# Patient Record
Sex: FEMALE | Race: BLACK OR AFRICAN AMERICAN | NOT HISPANIC OR LATINO | Employment: OTHER | URBAN - METROPOLITAN AREA
[De-identification: names, ages, dates, MRNs, and addresses within clinical notes are randomized per-mention and may not be internally consistent; named-entity substitution may affect disease eponyms.]

---

## 2023-10-03 ENCOUNTER — OFFICE VISIT (OUTPATIENT)
Dept: GERIATRIC MEDICINE | Facility: CLINIC | Age: 88
End: 2023-10-03
Payer: MEDICARE

## 2023-10-03 DIAGNOSIS — S06.5XAA SDH (SUBDURAL HEMATOMA) (MULTI): ICD-10-CM

## 2023-10-03 DIAGNOSIS — R16.1 SPLENOMEGALY: ICD-10-CM

## 2023-10-03 DIAGNOSIS — R63.4 WEIGHT LOSS: Primary | ICD-10-CM

## 2023-10-03 DIAGNOSIS — M85.80 OSTEOPENIA, UNSPECIFIED LOCATION: ICD-10-CM

## 2023-10-03 DIAGNOSIS — Z74.09 IMPAIRED FUNCTIONAL MOBILITY, BALANCE, GAIT, AND ENDURANCE: ICD-10-CM

## 2023-10-03 DIAGNOSIS — G47.00 INSOMNIA, UNSPECIFIED TYPE: ICD-10-CM

## 2023-10-03 DIAGNOSIS — D64.9 ANEMIA, UNSPECIFIED TYPE: ICD-10-CM

## 2023-10-03 DIAGNOSIS — R32 URINARY INCONTINENCE, UNSPECIFIED TYPE: ICD-10-CM

## 2023-10-03 DIAGNOSIS — E46 PROTEIN-CALORIE MALNUTRITION, UNSPECIFIED SEVERITY (MULTI): ICD-10-CM

## 2023-10-03 DIAGNOSIS — G31.83 NEUROCOGNITIVE DISORDER WITH LEWY BODIES (MULTI): ICD-10-CM

## 2023-10-03 DIAGNOSIS — F02.80 NEUROCOGNITIVE DISORDER WITH LEWY BODIES (MULTI): ICD-10-CM

## 2023-10-03 DIAGNOSIS — B35.1 ONYCHOMYCOSIS: ICD-10-CM

## 2023-10-03 DIAGNOSIS — R44.3 HALLUCINATIONS: ICD-10-CM

## 2023-10-03 DIAGNOSIS — D72.829 LEUKOCYTOSIS, UNSPECIFIED TYPE: ICD-10-CM

## 2023-10-03 DIAGNOSIS — R59.1 LYMPHADENOPATHY: ICD-10-CM

## 2023-10-03 PROCEDURE — 3078F DIAST BP <80 MM HG: CPT | Performed by: NURSE PRACTITIONER

## 2023-10-03 PROCEDURE — 1159F MED LIST DOCD IN RCRD: CPT | Performed by: NURSE PRACTITIONER

## 2023-10-03 PROCEDURE — 99345 HOME/RES VST NEW HIGH MDM 75: CPT | Performed by: NURSE PRACTITIONER

## 2023-10-03 PROCEDURE — 1126F AMNT PAIN NOTED NONE PRSNT: CPT | Performed by: NURSE PRACTITIONER

## 2023-10-03 PROCEDURE — 1160F RVW MEDS BY RX/DR IN RCRD: CPT | Performed by: NURSE PRACTITIONER

## 2023-10-03 PROCEDURE — 1036F TOBACCO NON-USER: CPT | Performed by: NURSE PRACTITIONER

## 2023-10-03 PROCEDURE — 3074F SYST BP LT 130 MM HG: CPT | Performed by: NURSE PRACTITIONER

## 2023-10-03 ASSESSMENT — PAIN SCALES - GENERAL: PAINLEVEL: 0-NO PAIN

## 2023-10-04 VITALS
HEART RATE: 72 BPM | BODY MASS INDEX: 17.46 KG/M2 | WEIGHT: 95.46 LBS | RESPIRATION RATE: 18 BRPM | SYSTOLIC BLOOD PRESSURE: 120 MMHG | DIASTOLIC BLOOD PRESSURE: 68 MMHG

## 2023-10-04 RX ORDER — ALBUTEROL SULFATE 0.83 MG/ML
3 SOLUTION RESPIRATORY (INHALATION) EVERY 6 HOURS PRN
COMMUNITY

## 2023-10-04 RX ORDER — PYRIDOXINE HCL (VITAMIN B6) 100 MG
100 TABLET ORAL DAILY
COMMUNITY
End: 2023-11-06 | Stop reason: SDDI

## 2023-10-04 ASSESSMENT — ENCOUNTER SYMPTOMS
DEPRESSION: 0
LOSS OF SENSATION IN FEET: 0
OCCASIONAL FEELINGS OF UNSTEADINESS: 1

## 2023-10-04 NOTE — PROGRESS NOTES
Summary Statement: Mrs. Mancera is a 89 yo elderly woman with a PMH significant form COPD/asthma, HFpEF (60%), HTN, Chronic compression fracture T6, polycythemia vera,  subdural hematoma (2019),  TIA, Dementia, hallucinations,  COVID 19 virus  with acute hypoxic respiratory  failure, (11/2022)  chronic leukocytosis, anemia, Lymphadenopathy concerning for lymphoma , Splenomegaly, PCV JAK2 V617F+,  UTI, weight loss,  polycythemia vera, and impaired gait    PSH:  hysterectomy ('60's) and cataract surgery    Reason for homebound status: leaving the home requires considerable taxing effort due impaired gait and the assistance of another person due to cognitive impairment     Reason for visit:  Weight Loss and establish new PCP relationship     HPI     -Mrs. Mancera is being seen today at home in the presence of her son, Reynold, who she has been living with since June 2023.  She is a poor historian due to dementia.      Patient was hospitalized in June 2023 for altered mental status was treated for an UTI.  Previously was living alone, but son moved her in with him    Patient receives [facility] services through [facility] to [facility] [phone]   She receives services on [facility] from 12:00 PM- 5:00 PM , but Reynold Rea is requesting a few additional hours on Saturdays.     States patient was on Olanzapine at the time of discharge, but he stopped it because it made her worse.  States occasional hallucinations, but none lately, he states though difficulty sleeping is a problem and he would like to improve that.     States patient used to received Nutritional Supplements and incontinence supplies, but states even before moving in with him, they had stopped.     [facility] stated Boost is on back order , but to send in script.  Reynold states over a period of time patient has lost a lot of weight.  She eats well and was eating when NP arrived.  Loves coffee, but he has limited it.      He would like for her to received PT- states her gait is unsteady- NP observed at the time of visit. Uses a Rollator. She denies any pain or discomfort     She resides on the upstairs in her bedroom   Home environment- clean and spacious  He did purchase a BSC for night-time use  Does not want a hospital bed    Occasional urinary continence- requesting Depends   Moving bowels well  Interval History:   Recent illness, hospitalizations or surgeries since last visit: No  Previous hospitalizations: N/A  Code Status-wishes to be resuscitated     Diet and Physical Activity:  Current Diet:  regular  Dietary Supplements: boost regular one tid   Appetite: good  Food Consistency: Regular  Liquids Consistency: thin   Gait/Mobility: Unsteady    Sensory impairments:  Visual: glasses No Last exam: unknown  Hearing: hearing No Last exam:   Dental: dentures No Last exam:     Activities of Daily Living  Bathing:  Needs Assistance  Dressing:  Needs Assistance  Toileting:  Needs Assistance  Feeding:   Dependent  Personal Hygiene:  Needs Assistance  Continence:       Bowels: Occasional Accident       Bladder:  Occasional Accident  Instrumental Activities of Daily Living  Managing Finances:  Dependent  Shopping:   Dependent  Managing Medications:   Dependent  Basic Home Maintenance:   Dependent  Housework:   Dependent  Laundry:       Dependent  Preparing Meals:   Dependent  Other AssistanceN/A      Advanced Directives on file NO        Falls Risk Screening:  Have you fallen in last 6 months? /N  If yes, did your fall result in injury? NA    Home Safety Risk Factors:  None ,  Loose Rugs,  Poor Lighting,  Uneven Floors, No Grab Bars, Bathroom, Unfamiliar surrounds, Household Clutter, No stair Handrails,   See Above   Advance Directives:  Discussed, verbal or written information provided if indicated  Living will: N  ,   Healthcare POA: N  Patient's End of Life Decisions:  Reviewed with pt: Wishes to be resuscitated   Full Code [ X],  DNR-CC Arrest [ ],  DNR-CC [ ]  Additional information:    Merged with [facility] Elder Abuse Screening:  Have you relied on people for any of the following: bathing, dressing, shopping, banking, or meals? Y/N  Has anyone prevented you from getting: food, clothes, medications, glasses, hearing aides, medical care, or from being with people you want to be with? Y/N  Have you been upset because someone talked to you in a way that made you feel shamed or threatened? Y/N  Has anyone tried to force you to sign papers or to use money against your will? Y/N  Has anyone made you afraid, touched you in ways that you did not want or hurt you physically? Y/N  Merged with [facility] Controlled Substance Monitoring:  Medication #1:  Medication name: ________  Pill Count: ____  Recent lab results: ___  Signs of misuse: Y/N    ____________________  Signs of Abuse: Y/N     ____________________  Signs of Diversion: Y/N ____________________  Medication #2  Medication name: ________  Pill Count: ____  Recent lab results: ___  Signs of misuse: Y/N    ____________________  Signs of Abuse: Y/N     ____________________  Signs of Diversion: Y/N ____________________  Medication #3  Medication name: ________  Pill Count: ____  Recent lab results: ___  Signs of misuse: Y/N    ____________________  Signs of Abuse: Y/N     ____________________  Signs of Diversion: Y/N ____________________        Review of Systems    -Unable to obtain due to cognitive impairment-see HPI       Physical Exam    GENERAL APPEARANCE: A&O x 2, thin, NAD  HEAD: normocephalic, atraumatic.  THROAT: Oral cavity and pharynx normal. No inflammation, swelling, exudate, or lesions.  MMM. Tongue pink and midline. No thrush. Missing teeth. No halitosis.   NECK: Neck supple, non-tender without lymphadenopathy, masses or thyromegaly.  CARDIAC: Normal S1 and S2. No S3, S4 or murmurs. Rhythm is regular. There is no peripheral edema, cyanosis or pallor. Extremities are warm and well perfused. No carotid  bruits.  LUNGS: Clear to auscultation bilaterally without rales, rhonchi, wheezing or diminished breath sounds. No cough.  ABDOMEN: Positive bowel sounds. Soft, nondistended, nontender. No guarding or rebound. + hernia vs lipoma right side of abdomen, nontender, BS normoactive x 4 , and wearing a diaper.     EXTREMITIES: Gait unsteady. Slight contractures to knees Peripheral pulses intact. No varicosities.  SKIN: Skin normal color, texture and turgor with no lesions or rash. Toenails are thick .  NEURO: No tremors, speech clear, follows commands, ST memory impaired, and LT memory intact.   PSYCHIATRIC: oriented to person & place.  Without hallucinations, abnormal affect or abnormal behaviors during the examination. Patient is not suicidal.        Assessment/Plan     1. Weight loss- Protein-calorie malnutrition, unspecified severity   -weighs 95.4 lbs  -Continue Boost one carton three times a day ( on back order per [facility])  -Continue Vitamin B6 100 mg daily  -Check B6 , B12, CMP, Prealbumin, and TSH       2. Insomnia, unspecified type-  -discussed starting Melatonin 1-3 mg at bedtime  -Turkey meat in the evening   -May try Sleepy Time Tea (hold if increasing urination)   -Continue to monitor       3. Neurocognitive disorder with Lewy bodies -SDH (subdural hematoma) - Hallucinations  -remote history of  SDH   -Olanzapine made behavior worse  -Try to improve sleep practices for now   -Previous mentation documented to be AOx2-3 (November 2022),  now at baseline.   -CT of the head w/extensive white matter changes, remote infarcts, volume loss  .    4. Splenomegaly -Chronic leukocytosis   Lymphadenopathy concerning for lymphoma   Documentation taken from 11/30/2022 discharge summary note-   PCV JAK2 V617F+  - Established with Dr. Irizarry ([facility]) - Dr. Irizarry recommended BM bx in 2019 which patient refused. Cell counts have been relatively stable since then. Pt was taken off of Hydroxyurea in 11/2021 due  to anemia. Severe leukocytosis persists with  significant splenomegaly and lymphadenopathy concerning for lymphoma. After discussion with the patient, she prefers to have this worked up outpatient. HEME labs largely pending; flow cytometry,  surface marker, path review. Discussed case by Dr Mosqueda with hematology/oncology, appreciate assistance. Confirmed onc team ok with dexamethasone prior to starting 11/23 - no current plans for bx.      -NP will discuss with son at next visit in one month  -check CBC   5. Urinary incontinence, unspecified type-  -will order depends ( size small), size large gloves,   Luda-wipes, and body wash       6. Anemia, unspecified type-  -check cbc, folate       7 Impaired functional mobility, balance, gait, and endurance-  -referral made to  Home Care for PT to evaluate and treat         8. Onychomycosis-  -referral made to Podiatry     9. Osteopenia -  -check Vitamin D level    -Stable  -Routine follow up in one month

## 2023-10-05 ENCOUNTER — HOME HEALTH ADMISSION (OUTPATIENT)
Dept: HOME HEALTH SERVICES | Facility: HOME HEALTH | Age: 88
End: 2023-10-05
Payer: MEDICARE

## 2023-10-05 PROBLEM — Z86.73 PERSONAL HISTORY OF TRANSIENT ISCHEMIC ATTACK (TIA), AND CEREBRAL INFARCTION WITHOUT RESIDUAL DEFICITS: Status: ACTIVE | Noted: 2019-10-10

## 2023-10-05 PROBLEM — J45.909 UNSPECIFIED ASTHMA, UNCOMPLICATED (HHS-HCC): Status: ACTIVE | Noted: 2019-10-10

## 2023-10-05 PROBLEM — R60.9 EDEMA: Status: ACTIVE | Noted: 2023-10-05

## 2023-10-05 PROBLEM — I10 ESSENTIAL HYPERTENSION: Status: ACTIVE | Noted: 2019-10-10

## 2023-10-05 PROBLEM — E46 PROTEIN-CALORIE MALNUTRITION, UNSPECIFIED SEVERITY (MULTI): Status: ACTIVE | Noted: 2023-10-05

## 2023-10-05 PROBLEM — R63.4 WEIGHT LOSS: Status: ACTIVE | Noted: 2023-10-05

## 2023-10-05 PROBLEM — S06.5XAA SDH (SUBDURAL HEMATOMA) (MULTI): Status: ACTIVE | Noted: 2023-10-05

## 2023-10-05 RX ORDER — CARBOXYMETHYLCELLULOSE SODIUM 10 MG/ML
SOLUTION/ DROPS OPHTHALMIC
COMMUNITY

## 2023-10-10 ENCOUNTER — HOME CARE VISIT (OUTPATIENT)
Dept: HOME HEALTH SERVICES | Facility: HOME HEALTH | Age: 88
End: 2023-10-10
Payer: MEDICARE

## 2023-10-10 PROCEDURE — 1090000001 HH PPS REVENUE CREDIT

## 2023-10-10 PROCEDURE — 0023 HH SOC

## 2023-10-10 PROCEDURE — G0151 HHCP-SERV OF PT,EA 15 MIN: HCPCS | Mod: HHH

## 2023-10-10 PROCEDURE — 1090000002 HH PPS REVENUE DEBIT

## 2023-10-10 PROCEDURE — 169592 NO-PAY CLAIM PROCEDURE

## 2023-10-11 ENCOUNTER — HOME CARE VISIT (OUTPATIENT)
Dept: HOME HEALTH SERVICES | Facility: HOME HEALTH | Age: 88
End: 2023-10-11
Payer: MEDICARE

## 2023-10-11 PROCEDURE — 1090000001 HH PPS REVENUE CREDIT

## 2023-10-11 PROCEDURE — 1090000002 HH PPS REVENUE DEBIT

## 2023-10-12 VITALS — SYSTOLIC BLOOD PRESSURE: 112 MMHG | DIASTOLIC BLOOD PRESSURE: 58 MMHG

## 2023-10-12 PROCEDURE — 1090000002 HH PPS REVENUE DEBIT

## 2023-10-12 PROCEDURE — 1090000001 HH PPS REVENUE CREDIT

## 2023-10-12 ASSESSMENT — ENCOUNTER SYMPTOMS
SUBJECTIVE PAIN PROGRESSION: UNCHANGED
HIGHEST PAIN SEVERITY IN PAST 24 HOURS: 0/10
PAIN: 1
PERSON REPORTING PAIN: PATIENT
PAIN SEVERITY GOAL: 0/10
DEPRESSION: 0
LOSS OF SENSATION IN FEET: 0
OCCASIONAL FEELINGS OF UNSTEADINESS: 1
LOWEST PAIN SEVERITY IN PAST 24 HOURS: 0/10
MUSCLE WEAKNESS: 1

## 2023-10-12 ASSESSMENT — PAIN SCALES - PAIN ASSESSMENT IN ADVANCED DEMENTIA (PAINAD)
FACIALEXPRESSION: 0
BREATHING: 0
NEGVOCALIZATION: 0 - NONE.
NEGVOCALIZATION: 0
TOTALSCORE: 0
COMMENTS: NO SOB
FACIALEXPRESSION: 0 - SMILING OR INEXPRESSIVE.
CONSOLABILITY: 0
BODYLANGUAGE: 0 - RELAXED.
CONSOLABILITY: 0 - NO NEED TO CONSOLE.
BODYLANGUAGE: 0

## 2023-10-12 ASSESSMENT — ACTIVITIES OF DAILY LIVING (ADL)
AMBULATION_DISTANCE/DURATION_TOLERATED: 25
ENTERING_EXITING_HOME: NEEDS ASSISTANCE
AMBULATION ASSISTANCE: STAND BY ASSIST
AMBULATION ASSISTANCE ON FLAT SURFACES: 1
OASIS_M1830: 02
AMBULATION ASSISTANCE: 1

## 2023-10-13 PROCEDURE — 1090000002 HH PPS REVENUE DEBIT

## 2023-10-13 PROCEDURE — 1090000001 HH PPS REVENUE CREDIT

## 2023-10-14 PROCEDURE — 1090000001 HH PPS REVENUE CREDIT

## 2023-10-14 PROCEDURE — 1090000002 HH PPS REVENUE DEBIT

## 2023-10-14 PROCEDURE — G0180 MD CERTIFICATION HHA PATIENT: HCPCS | Performed by: NURSE PRACTITIONER

## 2023-10-15 PROCEDURE — 1090000001 HH PPS REVENUE CREDIT

## 2023-10-15 PROCEDURE — 1090000002 HH PPS REVENUE DEBIT

## 2023-10-16 PROCEDURE — 1090000002 HH PPS REVENUE DEBIT

## 2023-10-16 PROCEDURE — 1090000001 HH PPS REVENUE CREDIT

## 2023-10-17 ENCOUNTER — APPOINTMENT (OUTPATIENT)
Dept: HOME HEALTH SERVICES | Facility: HOME HEALTH | Age: 88
End: 2023-10-17

## 2023-10-17 ENCOUNTER — HOME CARE VISIT (OUTPATIENT)
Dept: HOME HEALTH SERVICES | Facility: HOME HEALTH | Age: 88
End: 2023-10-17
Payer: MEDICARE

## 2023-10-17 VITALS — SYSTOLIC BLOOD PRESSURE: 100 MMHG | RESPIRATION RATE: 18 BRPM | HEART RATE: 66 BPM | DIASTOLIC BLOOD PRESSURE: 62 MMHG

## 2023-10-17 PROCEDURE — 1090000001 HH PPS REVENUE CREDIT

## 2023-10-17 PROCEDURE — 1090000002 HH PPS REVENUE DEBIT

## 2023-10-17 PROCEDURE — G0157 HHC PT ASSISTANT EA 15: HCPCS | Mod: HHH

## 2023-10-18 PROCEDURE — 1090000002 HH PPS REVENUE DEBIT

## 2023-10-18 PROCEDURE — 1090000001 HH PPS REVENUE CREDIT

## 2023-10-19 ENCOUNTER — HOME CARE VISIT (OUTPATIENT)
Dept: HOME HEALTH SERVICES | Facility: HOME HEALTH | Age: 88
End: 2023-10-19
Payer: MEDICARE

## 2023-10-19 VITALS — DIASTOLIC BLOOD PRESSURE: 62 MMHG | HEART RATE: 78 BPM | SYSTOLIC BLOOD PRESSURE: 90 MMHG | RESPIRATION RATE: 18 BRPM

## 2023-10-19 PROCEDURE — 1090000001 HH PPS REVENUE CREDIT

## 2023-10-19 PROCEDURE — 1090000002 HH PPS REVENUE DEBIT

## 2023-10-19 PROCEDURE — G0157 HHC PT ASSISTANT EA 15: HCPCS | Mod: HHH

## 2023-10-20 PROCEDURE — 1090000002 HH PPS REVENUE DEBIT

## 2023-10-20 PROCEDURE — 1090000001 HH PPS REVENUE CREDIT

## 2023-10-21 PROCEDURE — 1090000001 HH PPS REVENUE CREDIT

## 2023-10-21 PROCEDURE — 1090000002 HH PPS REVENUE DEBIT

## 2023-10-22 PROCEDURE — 1090000001 HH PPS REVENUE CREDIT

## 2023-10-22 PROCEDURE — 1090000002 HH PPS REVENUE DEBIT

## 2023-10-23 PROCEDURE — 1090000002 HH PPS REVENUE DEBIT

## 2023-10-23 PROCEDURE — 1090000001 HH PPS REVENUE CREDIT

## 2023-10-24 ENCOUNTER — HOME CARE VISIT (OUTPATIENT)
Dept: HOME HEALTH SERVICES | Facility: HOME HEALTH | Age: 88
End: 2023-10-24
Payer: MEDICARE

## 2023-10-24 VITALS — RESPIRATION RATE: 18 BRPM | HEART RATE: 68 BPM | DIASTOLIC BLOOD PRESSURE: 62 MMHG | SYSTOLIC BLOOD PRESSURE: 100 MMHG

## 2023-10-24 PROCEDURE — 1090000001 HH PPS REVENUE CREDIT

## 2023-10-24 PROCEDURE — G0157 HHC PT ASSISTANT EA 15: HCPCS | Mod: HHH

## 2023-10-24 PROCEDURE — 1090000002 HH PPS REVENUE DEBIT

## 2023-10-25 PROCEDURE — 1090000002 HH PPS REVENUE DEBIT

## 2023-10-25 PROCEDURE — 1090000001 HH PPS REVENUE CREDIT

## 2023-10-26 ENCOUNTER — HOME CARE VISIT (OUTPATIENT)
Dept: HOME HEALTH SERVICES | Facility: HOME HEALTH | Age: 88
End: 2023-10-26
Payer: MEDICARE

## 2023-10-26 VITALS — RESPIRATION RATE: 17 BRPM | HEART RATE: 64 BPM | SYSTOLIC BLOOD PRESSURE: 98 MMHG | DIASTOLIC BLOOD PRESSURE: 58 MMHG

## 2023-10-26 PROCEDURE — 1090000002 HH PPS REVENUE DEBIT

## 2023-10-26 PROCEDURE — G0157 HHC PT ASSISTANT EA 15: HCPCS | Mod: HHH

## 2023-10-26 PROCEDURE — 1090000001 HH PPS REVENUE CREDIT

## 2023-10-27 PROCEDURE — 1090000002 HH PPS REVENUE DEBIT

## 2023-10-27 PROCEDURE — 1090000001 HH PPS REVENUE CREDIT

## 2023-10-28 PROCEDURE — 1090000002 HH PPS REVENUE DEBIT

## 2023-10-28 PROCEDURE — 1090000001 HH PPS REVENUE CREDIT

## 2023-10-29 PROCEDURE — 1090000002 HH PPS REVENUE DEBIT

## 2023-10-29 PROCEDURE — 1090000001 HH PPS REVENUE CREDIT

## 2023-10-30 PROCEDURE — 1090000002 HH PPS REVENUE DEBIT

## 2023-10-30 PROCEDURE — 1090000001 HH PPS REVENUE CREDIT

## 2023-10-31 ENCOUNTER — HOME CARE VISIT (OUTPATIENT)
Dept: HOME HEALTH SERVICES | Facility: HOME HEALTH | Age: 88
End: 2023-10-31
Payer: MEDICARE

## 2023-10-31 VITALS — RESPIRATION RATE: 18 BRPM | SYSTOLIC BLOOD PRESSURE: 104 MMHG | DIASTOLIC BLOOD PRESSURE: 62 MMHG | HEART RATE: 78 BPM

## 2023-10-31 PROCEDURE — 1090000001 HH PPS REVENUE CREDIT

## 2023-10-31 PROCEDURE — G0157 HHC PT ASSISTANT EA 15: HCPCS | Mod: HHH

## 2023-10-31 PROCEDURE — 1090000002 HH PPS REVENUE DEBIT

## 2023-11-01 PROCEDURE — 1090000002 HH PPS REVENUE DEBIT

## 2023-11-01 PROCEDURE — 1090000001 HH PPS REVENUE CREDIT

## 2023-11-02 ENCOUNTER — HOME CARE VISIT (OUTPATIENT)
Dept: HOME HEALTH SERVICES | Facility: HOME HEALTH | Age: 88
End: 2023-11-02
Payer: MEDICARE

## 2023-11-02 VITALS — DIASTOLIC BLOOD PRESSURE: 58 MMHG | HEART RATE: 72 BPM | SYSTOLIC BLOOD PRESSURE: 100 MMHG | RESPIRATION RATE: 18 BRPM

## 2023-11-02 PROCEDURE — 1090000001 HH PPS REVENUE CREDIT

## 2023-11-02 PROCEDURE — G0157 HHC PT ASSISTANT EA 15: HCPCS | Mod: HHH

## 2023-11-02 PROCEDURE — 1090000002 HH PPS REVENUE DEBIT

## 2023-11-03 PROCEDURE — 1090000001 HH PPS REVENUE CREDIT

## 2023-11-03 PROCEDURE — 1090000002 HH PPS REVENUE DEBIT

## 2023-11-04 PROCEDURE — 1090000001 HH PPS REVENUE CREDIT

## 2023-11-04 PROCEDURE — 1090000002 HH PPS REVENUE DEBIT

## 2023-11-05 PROCEDURE — 1090000001 HH PPS REVENUE CREDIT

## 2023-11-05 PROCEDURE — 1090000002 HH PPS REVENUE DEBIT

## 2023-11-06 ENCOUNTER — OFFICE VISIT (OUTPATIENT)
Dept: GERIATRIC MEDICINE | Facility: CLINIC | Age: 88
End: 2023-11-06
Payer: MEDICARE

## 2023-11-06 VITALS
SYSTOLIC BLOOD PRESSURE: 120 MMHG | BODY MASS INDEX: 18.06 KG/M2 | DIASTOLIC BLOOD PRESSURE: 60 MMHG | RESPIRATION RATE: 20 BRPM | WEIGHT: 98.79 LBS | HEART RATE: 70 BPM

## 2023-11-06 DIAGNOSIS — G47.00 INSOMNIA, UNSPECIFIED TYPE: ICD-10-CM

## 2023-11-06 DIAGNOSIS — R63.4 WEIGHT LOSS: ICD-10-CM

## 2023-11-06 DIAGNOSIS — K21.9 GASTROESOPHAGEAL REFLUX DISEASE WITHOUT ESOPHAGITIS: Primary | ICD-10-CM

## 2023-11-06 DIAGNOSIS — M17.12 OSTEOARTHRITIS OF LEFT KNEE, UNSPECIFIED OSTEOARTHRITIS TYPE: ICD-10-CM

## 2023-11-06 DIAGNOSIS — M79.605 PAIN IN LEFT LEG: ICD-10-CM

## 2023-11-06 PROCEDURE — 99349 HOME/RES VST EST MOD MDM 40: CPT | Performed by: NURSE PRACTITIONER

## 2023-11-06 PROCEDURE — 1036F TOBACCO NON-USER: CPT | Performed by: NURSE PRACTITIONER

## 2023-11-06 PROCEDURE — 1160F RVW MEDS BY RX/DR IN RCRD: CPT | Performed by: NURSE PRACTITIONER

## 2023-11-06 PROCEDURE — 1126F AMNT PAIN NOTED NONE PRSNT: CPT | Performed by: NURSE PRACTITIONER

## 2023-11-06 PROCEDURE — 1159F MED LIST DOCD IN RCRD: CPT | Performed by: NURSE PRACTITIONER

## 2023-11-06 PROCEDURE — 3074F SYST BP LT 130 MM HG: CPT | Performed by: NURSE PRACTITIONER

## 2023-11-06 PROCEDURE — 1090000001 HH PPS REVENUE CREDIT

## 2023-11-06 PROCEDURE — 1090000002 HH PPS REVENUE DEBIT

## 2023-11-06 PROCEDURE — 3078F DIAST BP <80 MM HG: CPT | Performed by: NURSE PRACTITIONER

## 2023-11-06 RX ORDER — CHOLECALCIFEROL (VITAMIN D3) 50 MCG
20 CAPSULE ORAL
Qty: 90 CAPSULE | Refills: 3 | Status: SHIPPED | OUTPATIENT
Start: 2023-11-06

## 2023-11-06 RX ORDER — MELATONIN 3 MG
1 CAPSULE ORAL NIGHTLY PRN
COMMUNITY
End: 2024-02-18

## 2023-11-06 RX ORDER — CHOLECALCIFEROL (VITAMIN D3) 50 MCG
20 CAPSULE ORAL
COMMUNITY
End: 2023-11-06 | Stop reason: SDUPTHER

## 2023-11-06 ASSESSMENT — PAIN SCALES - GENERAL: PAINLEVEL: 0-NO PAIN

## 2023-11-06 NOTE — PROGRESS NOTES
Reason for visit:  GERD         Expand All Collapse All             Summary Statement: Mrs. Mancera is a 89 yo elderly woman with a PMH significant form COPD/asthma, HFpEF (60%), HTN, Chronic compression fracture T6, polycythemia vera,  subdural hematoma (2019),  TIA, Dementia, hallucinations,  COVID 19 virus  with acute hypoxic respiratory  failure, (11/2022)  chronic leukocytosis, anemia, Lymphadenopathy concerning for lymphoma , Splenomegaly, PCV JAK2 V617F+,  UTI, weight loss,  polycythemia vera, and impaired gait     PSH:  hysterectomy ('60's) and cataract surgery     Reason for homebound status: leaving the home requires considerable taxing effort due impaired gait and the assistance of another person due to cognitive impairment      Reason for visit:  Follow up for insomnia and management of chronic active illnesses    HPI: Mrs. Mancera is being seen today at home in the presence of her son, Reynold who is her primary caregiver.   She is a poor historian due to dementia.     In the interim, Reynold states that patient continues to awaken early in the morning (2-3 AM) .  He had difficulty finding the 1 mg Melatonin tablets so he never started.     She continues to struggle with drinking water.  But, loves coffee which he has limit to one a day, and switched to decaffeinated.      [facility] has all documentation for Boost HC shakes-but product is on backorder  Reynold states he did received the diapers -incontinent of urine at times    Expand All Collapse All      Interval History:   Recent illness, hospitalizations or surgeries since last visit: No  Previous hospitalizations: N/A  Code Status-wishes to be resuscitated      Diet and Physical Activity:  Current Diet:  regular  Dietary Supplements: boost regular one tid   Appetite: good  Food Consistency: Regular  Liquids Consistency: thin   Gait/Mobility: Unsteady     Sensory impairments:  Visual: glasses No Last exam: unknown  Hearing: hearing No Last exam:    Dental: dentures No Last exam:      Activities of Daily Living  Bathing:  Needs Assistance  Dressing:  Needs Assistance  Toileting:  Needs Assistance  Feeding:   Dependent  Personal Hygiene:  Needs Assistance  Continence:       Bowels: Occasional Accident       Bladder:  Occasional Accident  Instrumental Activities of Daily Living  Managing Finances:  Dependent  Shopping:   Dependent  Managing Medications:   Dependent  Basic Home Maintenance:   Dependent  Housework:   Dependent  Laundry:       Dependent  Preparing Meals:   Dependent  Other AssistanceN/A        Advanced Directives on file NO           Falls Risk Screening:  Have you fallen in last 6 months? /N  If yes, did your fall result in injury? NA     Home Safety Risk Factors:  None ,  Loose Rugs,  Poor Lighting,  Uneven Floors, No Grab Bars, Bathroom, Unfamiliar surrounds, Household Clutter, No stair Handrails,   See Above   Advance Directives:  Discussed, verbal or written information provided if indicated  Living will: N  ,   Healthcare POA: N  Patient's End of Life Decisions:  Reviewed with pt: Wishes to be resuscitated            I         Physical Exam     GENERAL APPEARANCE: A&O x 2, thin, NAD. Jovial .  HEAD: normocephalic, atraumatic.    THROAT: Oral cavity and pharynx normal. No inflammation, swelling, exudate, or lesions.  MMM. Tongue pink and midline. No thrush. Missing teeth.  Eating - no coughing, choking or difficulty swallowing    NECK: Neck supple, non-tender without lymphadenopathy, masses or thyromegaly.    CARDIAC: Normal S1 and S2. No S3, S4 or murmurs. Rhythm is regular. There is no peripheral edema, cyanosis or pallor. Extremities are warm and well perfused. No carotid bruits.    LUNGS: Clear to auscultation bilaterally without rales, rhonchi, wheezing or diminished breath sounds. No cough.    ABDOMEN: Positive bowel sounds. Soft, nondistended, nontender. No guarding or rebound. + hernia vs lipoma right side of abdomen, nontender, BS  normoactive x 4 , and wearing a diaper.      EXTREMITIES: Gait unsteady. Slight contractures to knees Peripheral pulses intact. No varicosities.    SKIN: Skin normal color, texture and turgor with no lesions or rash.     NEURO: No tremors, speech clear, follows commands, ST memory impaired, and LT memory intact.-More lucid and accurate with recall at times.     PSYCHIATRIC: oriented to person & place.  Without hallucinations, abnormal affect or abnormal behaviors during the examination. Patient is not suicidal.                  Weights    10/3/2023=95.7 lbs  11/6/2023=98.6 lbs          Falls Risk Screening:  Have you fallen in last 6 months? No       Home Safety Risk Factors:  none          Review of Systems    -Unable to obtain due to cognitive impairment  however, patient was able to state that she experiences mid esophageal burning when she drinks water ( son believes it may be with food)   -Denies any pain or discomfort  -Reynold reports patient has not diarrhea or constipation  -No c/o of CP or SOB  -Patient denies dizziness or headaches     Prior to Admission medications    Medication Sig Start Date End Date Taking? Authorizing Provider   albuterol 2.5 mg /3 mL (0.083 %) nebulizer solution Inhale 3 mL every 6 hours if needed.   Yes Historical Provider, MD   carboxymethylcellulose PF (Artificial Tears, cmc,) 1 % ophthalmic solution Administer into affected eye(s).   Yes Historical Provider, MD   melatonin 3 mg capsule Take 1 capsule by mouth as needed at bedtime (insomnia).   Yes Historical Provider, MD   omeprazole (Acid Reducer, omeprazole,) 20 mg capsule,delayed release(DR/EC) Take 1 capsule (20 mg) by mouth once daily in the morning. Take before meals. 11/6/23   VERONICA Metzger-CNP   omeprazole (Acid Reducer, omeprazole,) 20 mg capsule,delayed release(DR/EC) Take 1 capsule (20 mg) by mouth once daily in the morning. Take before meals.  11/6/23  Historical Provider, MD   pyridoxine (Vitamin B-6) 100 mg  "tablet Take 1 tablet (100 mg) by mouth once daily. as directed  11/6/23  Historical Provider, MD               No results found for: \"CBCDIF\"  No results found for: \"CMPLAS\"  Lab Results   Component Value Date    TSH 3.83 11/09/2023     No results found for: \"FREET4\"  Lab Results   Component Value Date    XSEQGFDZ83 806 11/09/2023     No results found for: \"HGBA1C\"  Lab Results   Component Value Date    VITD25 33 11/09/2023        Assessment/Plan     1. Gastroesophageal reflux disease without esophagitis  -patient endorses burning -mid esophageal with liquids  - Start omeprazole (Acid Reducer, omeprazole,) 20 mg capsule,delayed release(DR/EC); Take 1 capsule (20 mg) by mouth once daily in the morning. Take before meals.     2. Insomnia, unspecified type  -poorly controlled  -see HPI  -May give 1/2 tablet of Melatonin 3 mg nightly, and may titrate to one whole table      3. Pain in left leg  -no s/s of swelling or DVT  -Recommended topical pain cream up to three times a day as needed and Tylenol prn     4. Osteoarthritis of left knee, unspecified osteoarthritis type  -see number 3    5. Weight loss  -weight is up 2.4 lbs in one month  -Boost HC shakes on back order.  Son has been purchasing out of pocket  -Continue to monitor     -Stable  -Routine follow up in 4-6 weeks ( insomnia)    "

## 2023-11-07 ENCOUNTER — HOME CARE VISIT (OUTPATIENT)
Dept: HOME HEALTH SERVICES | Facility: HOME HEALTH | Age: 88
End: 2023-11-07
Payer: MEDICARE

## 2023-11-07 ENCOUNTER — HOME HEALTH ADMISSION (OUTPATIENT)
Dept: HOME HEALTH SERVICES | Facility: HOME HEALTH | Age: 88
End: 2023-11-07

## 2023-11-07 VITALS — DIASTOLIC BLOOD PRESSURE: 62 MMHG | RESPIRATION RATE: 18 BRPM | HEART RATE: 72 BPM | SYSTOLIC BLOOD PRESSURE: 100 MMHG

## 2023-11-07 PROCEDURE — 1090000002 HH PPS REVENUE DEBIT

## 2023-11-07 PROCEDURE — G0157 HHC PT ASSISTANT EA 15: HCPCS | Mod: HHH

## 2023-11-07 PROCEDURE — 1090000001 HH PPS REVENUE CREDIT

## 2023-11-08 PROCEDURE — 1090000001 HH PPS REVENUE CREDIT

## 2023-11-08 PROCEDURE — 1090000003 HH PPS REVENUE ADJ

## 2023-11-08 PROCEDURE — 1090000002 HH PPS REVENUE DEBIT

## 2023-11-09 ENCOUNTER — LAB (OUTPATIENT)
Dept: LAB | Facility: LAB | Age: 88
End: 2023-11-09
Payer: MEDICARE

## 2023-11-09 ENCOUNTER — HOME CARE VISIT (OUTPATIENT)
Dept: HOME HEALTH SERVICES | Facility: HOME HEALTH | Age: 88
End: 2023-11-09

## 2023-11-09 ENCOUNTER — HOME CARE VISIT (OUTPATIENT)
Dept: HOME HEALTH SERVICES | Facility: HOME HEALTH | Age: 88
End: 2023-11-09
Payer: MEDICARE

## 2023-11-09 VITALS — SYSTOLIC BLOOD PRESSURE: 100 MMHG | HEART RATE: 77 BPM | OXYGEN SATURATION: 96 % | DIASTOLIC BLOOD PRESSURE: 55 MMHG

## 2023-11-09 DIAGNOSIS — R63.4 WEIGHT LOSS: ICD-10-CM

## 2023-11-09 DIAGNOSIS — D64.9 ANEMIA, UNSPECIFIED TYPE: ICD-10-CM

## 2023-11-09 DIAGNOSIS — M85.80 OSTEOPENIA, UNSPECIFIED LOCATION: ICD-10-CM

## 2023-11-09 DIAGNOSIS — D72.829 LEUKOCYTOSIS, UNSPECIFIED TYPE: ICD-10-CM

## 2023-11-09 LAB
25(OH)D3 SERPL-MCNC: 33 NG/ML (ref 30–100)
ALBUMIN SERPL BCP-MCNC: 3.8 G/DL (ref 3.4–5)
ALP SERPL-CCNC: 119 U/L (ref 33–136)
ALT SERPL W P-5'-P-CCNC: 12 U/L (ref 7–45)
ANION GAP SERPL CALC-SCNC: 15 MMOL/L (ref 10–20)
AST SERPL W P-5'-P-CCNC: 23 U/L (ref 9–39)
BASOPHILS # BLD MANUAL: 0.58 X10*3/UL (ref 0–0.1)
BASOPHILS NFR BLD MANUAL: 3 %
BILIRUB SERPL-MCNC: 0.6 MG/DL (ref 0–1.2)
BUN SERPL-MCNC: 20 MG/DL (ref 6–23)
BURR CELLS BLD QL SMEAR: ABNORMAL
CALCIUM SERPL-MCNC: 8.6 MG/DL (ref 8.6–10.3)
CHLORIDE SERPL-SCNC: 101 MMOL/L (ref 98–107)
CO2 SERPL-SCNC: 27 MMOL/L (ref 21–32)
CREAT SERPL-MCNC: 0.62 MG/DL (ref 0.5–1.05)
DACRYOCYTES BLD QL SMEAR: ABNORMAL
EOSINOPHIL # BLD MANUAL: 0.39 X10*3/UL (ref 0–0.4)
EOSINOPHIL NFR BLD MANUAL: 2 %
ERYTHROCYTE [DISTWIDTH] IN BLOOD BY AUTOMATED COUNT: 21.9 % (ref 11.5–14.5)
FOLATE SERPL-MCNC: 13.3 NG/ML
GFR SERPL CREATININE-BSD FRML MDRD: 84 ML/MIN/1.73M*2
GLUCOSE SERPL-MCNC: 68 MG/DL (ref 74–99)
HCT VFR BLD AUTO: 37.5 % (ref 36–46)
HGB BLD-MCNC: 10.5 G/DL (ref 12–16)
IMM GRANULOCYTES # BLD AUTO: 3.53 X10*3/UL (ref 0–0.5)
IMM GRANULOCYTES NFR BLD AUTO: 18.3 % (ref 0–0.9)
LYMPHOCYTES # BLD MANUAL: 1.74 X10*3/UL (ref 0.8–3)
LYMPHOCYTES NFR BLD MANUAL: 9 %
MCH RBC QN AUTO: 23.2 PG (ref 26–34)
MCHC RBC AUTO-ENTMCNC: 28 G/DL (ref 32–36)
MCV RBC AUTO: 83 FL (ref 80–100)
METAMYELOCYTES # BLD MANUAL: 0.39 X10*3/UL
METAMYELOCYTES NFR BLD MANUAL: 2 %
MONOCYTES # BLD MANUAL: 1.16 X10*3/UL (ref 0.05–0.8)
MONOCYTES NFR BLD MANUAL: 6 %
NEUTROPHILS # BLD MANUAL: 15.06 X10*3/UL (ref 1.6–5.5)
NEUTS BAND # BLD MANUAL: 1.16 X10*3/UL (ref 0–0.5)
NEUTS BAND NFR BLD MANUAL: 6 %
NEUTS SEG # BLD MANUAL: 13.9 X10*3/UL (ref 1.6–5)
NEUTS SEG NFR BLD MANUAL: 72 %
NRBC BLD-RTO: 1.8 /100 WBCS (ref 0–0)
PLATELET # BLD AUTO: 90 X10*3/UL (ref 150–450)
POLYCHROMASIA BLD QL SMEAR: ABNORMAL
POTASSIUM SERPL-SCNC: 3.8 MMOL/L (ref 3.5–5.3)
PREALB SERPL-MCNC: 8.7 MG/DL (ref 18–40)
PROT SERPL-MCNC: 7.5 G/DL (ref 6.4–8.2)
RBC # BLD AUTO: 4.52 X10*6/UL (ref 4–5.2)
RBC MORPH BLD: ABNORMAL
SCHISTOCYTES BLD QL SMEAR: ABNORMAL
SODIUM SERPL-SCNC: 139 MMOL/L (ref 136–145)
TOTAL CELLS COUNTED BLD: 100
TSH SERPL-ACNC: 3.83 MIU/L (ref 0.44–3.98)
VIT B12 SERPL-MCNC: 806 PG/ML (ref 211–911)
WBC # BLD AUTO: 19.3 X10*3/UL (ref 4.4–11.3)

## 2023-11-09 PROCEDURE — G0151 HHCP-SERV OF PT,EA 15 MIN: HCPCS | Mod: HHH

## 2023-11-09 PROCEDURE — 82607 VITAMIN B-12: CPT

## 2023-11-09 PROCEDURE — 84443 ASSAY THYROID STIM HORMONE: CPT

## 2023-11-09 PROCEDURE — 85027 COMPLETE CBC AUTOMATED: CPT

## 2023-11-09 PROCEDURE — 1090000003 HH PPS REVENUE ADJ

## 2023-11-09 PROCEDURE — 84134 ASSAY OF PREALBUMIN: CPT

## 2023-11-09 PROCEDURE — 82306 VITAMIN D 25 HYDROXY: CPT

## 2023-11-09 PROCEDURE — 36415 COLL VENOUS BLD VENIPUNCTURE: CPT

## 2023-11-09 PROCEDURE — 85007 BL SMEAR W/DIFF WBC COUNT: CPT

## 2023-11-09 PROCEDURE — 80053 COMPREHEN METABOLIC PANEL: CPT

## 2023-11-09 PROCEDURE — 0023 HH SOC

## 2023-11-09 PROCEDURE — 84207 ASSAY OF VITAMIN B-6: CPT

## 2023-11-09 PROCEDURE — 82746 ASSAY OF FOLIC ACID SERUM: CPT

## 2023-11-09 PROCEDURE — 1090000001 HH PPS REVENUE CREDIT

## 2023-11-09 PROCEDURE — 1090000002 HH PPS REVENUE DEBIT

## 2023-11-10 PROCEDURE — 1090000002 HH PPS REVENUE DEBIT

## 2023-11-10 PROCEDURE — 1090000001 HH PPS REVENUE CREDIT

## 2023-11-10 NOTE — PATIENT INSTRUCTIONS
Dear Mrs. Mancera,  It was a pleasure seeing you today.    Please start Omeprazole 20 mg once a day - take one tablet about 30 minutes before breakfast.    You may take Melatonin 3 mg- start with 1/2 tablet nightly as needed for trouble sleeping.  If this does no help, take one whole tablet.    You are doing well.     I will follow up with you in 4-6 weeks or sooner if needed.    Sincerely,  VERONICA Metzger-BC

## 2023-11-11 PROCEDURE — 1090000001 HH PPS REVENUE CREDIT

## 2023-11-11 PROCEDURE — 1090000002 HH PPS REVENUE DEBIT

## 2023-11-12 LAB — PYRIDOXAL PHOS SERPL-SCNC: 7.1 NMOL/L (ref 20–125)

## 2023-11-12 PROCEDURE — 1090000001 HH PPS REVENUE CREDIT

## 2023-11-12 PROCEDURE — 1090000002 HH PPS REVENUE DEBIT

## 2023-11-13 ASSESSMENT — ACTIVITIES OF DAILY LIVING (ADL)
OASIS_M1830: 02
HOME_HEALTH_OASIS: 01

## 2023-11-13 ASSESSMENT — ENCOUNTER SYMPTOMS
DENIES PAIN: 1
OCCASIONAL FEELINGS OF UNSTEADINESS: 0

## 2023-11-21 DIAGNOSIS — E53.1 VITAMIN B6 DEFICIENCY: Primary | ICD-10-CM

## 2023-11-21 RX ORDER — PYRIDOXINE HCL (VITAMIN B6) 100 MG
100 TABLET ORAL DAILY
Qty: 90 TABLET | Refills: 3 | Status: SHIPPED | OUTPATIENT
Start: 2023-11-21 | End: 2024-11-20

## 2024-01-10 ENCOUNTER — OFFICE VISIT (OUTPATIENT)
Dept: GERIATRIC MEDICINE | Facility: CLINIC | Age: 89
End: 2024-01-10
Payer: MEDICARE

## 2024-01-10 DIAGNOSIS — Z71.89 ADVANCED CARE PLANNING/COUNSELING DISCUSSION: ICD-10-CM

## 2024-01-10 DIAGNOSIS — G47.00 INSOMNIA, UNSPECIFIED TYPE: ICD-10-CM

## 2024-01-10 DIAGNOSIS — Z00.00 MEDICARE ANNUAL WELLNESS VISIT, SUBSEQUENT: Primary | ICD-10-CM

## 2024-01-10 DIAGNOSIS — I95.9 HYPOTENSION, UNSPECIFIED HYPOTENSION TYPE: ICD-10-CM

## 2024-01-10 DIAGNOSIS — F41.1 GAD (GENERALIZED ANXIETY DISORDER): ICD-10-CM

## 2024-01-10 PROCEDURE — 99215 OFFICE O/P EST HI 40 MIN: CPT | Performed by: NURSE PRACTITIONER

## 2024-01-10 PROCEDURE — 1036F TOBACCO NON-USER: CPT | Performed by: NURSE PRACTITIONER

## 2024-01-10 PROCEDURE — 1126F AMNT PAIN NOTED NONE PRSNT: CPT | Performed by: NURSE PRACTITIONER

## 2024-01-10 PROCEDURE — 3074F SYST BP LT 130 MM HG: CPT | Performed by: NURSE PRACTITIONER

## 2024-01-10 PROCEDURE — 3078F DIAST BP <80 MM HG: CPT | Performed by: NURSE PRACTITIONER

## 2024-01-10 PROCEDURE — G0439 PPPS, SUBSEQ VISIT: HCPCS | Performed by: NURSE PRACTITIONER

## 2024-01-10 PROCEDURE — 99349 HOME/RES VST EST MOD MDM 40: CPT | Performed by: NURSE PRACTITIONER

## 2024-01-10 PROCEDURE — 1159F MED LIST DOCD IN RCRD: CPT | Performed by: NURSE PRACTITIONER

## 2024-01-10 RX ORDER — ESCITALOPRAM OXALATE 5 MG/1
5 TABLET ORAL DAILY
Qty: 90 TABLET | Refills: 2 | Status: SHIPPED | OUTPATIENT
Start: 2024-01-10 | End: 2025-01-09

## 2024-01-10 ASSESSMENT — PAIN SCALES - GENERAL: PAINLEVEL: 0-NO PAIN

## 2024-01-10 NOTE — PROGRESS NOTES
Some elements may have been copied from prior note(s). The elements have been updated and reflect current evaluation, examination and decision making from today.         Reason for visit:  Medicare Annual Wellness Visit-Subsequent.      History of Present Illness  The last clinic visit was 11/6/2023. Management changes made at the last visit included added Melatonin for difficulty sleeping - was not done due to son had difficulty finding the correct dosage.   Associated symptoms: no depression, no snoring, no chronic pain, no nocturnal leg cramps, no restless legs and no hot flashes.   Medications:  the patient is adherent to his medication regimen.     Diet: She consumes a diverse and healthy diet.  Weight Issues: She does have weight concerns-but has gained   Exercise: She does not exercise regularly.  Smoking: She does not use tobacco.  Alcohol: She denies alcohol use.  Drug Use: She denies drug use.   Sleep habits:The patient sleeps 4-6 hours (poor-difficulty with sleep maintenance)       Past Medical, Surgical and Family History: reviewed and updated in chart.   Interval History: Patient has not been hospitalized previously.   Medications and Supplements: Medications and supplements, including calcium and vitamins reviewed and updated in chart.    No, the patient is not using opioids.   Health Risk Assessment:    During the past 4 weeks:   How much have you been bothered by feeling anxious, depressed, irritable or sad, downhearted or blue? No.   Has your physical and emotional health limited your social activities with family, friends, neighbors or groups? No.   In general bodily pain: Sometimes in my knees.   Was someone available to help you if you needed or wanted help: Yes.   The hardest physical activity you could do for at least 2 minutes: going downstairs   No, cannot get to places out of walking distance without help.   No, cannot shop for groceries or clothes without help.   No, does not prepare meals.    No, does not do housework without help.   No, does not handle money without help.   Does not need help eating, bathing, dressing, or getting around home.   Rates health in general: Good.   How have things been going for you? Good.   Having difficulties driving a car: Not applicable, I do not use a car.   Always fastens seatbelt when in a car: Yes, but I don't go anywhere  Has fallen or gotten dizzy when standing up: No  Sexual problems: N/A  Has trouble eating: Never  Has problems with teeth or dentures: No  Has problems using the telephone: No  Tired or fatigued: No  No, has not fallen 2 or more times in the past year. No, not afraid of falling.   Number of drinks of wine, beer or other alcoholic beverages: No alcohol at all.   Exercise for about 20 minutes 3 or more days a week: No.  Have you been given any information to help you with the following: Yes,   has given information regarding hazards in the home that might hurt you.   Yes, has been given information regarding keeping track of medications.   Do you have trouble taking medicines the way told to take them: No   Confidence in control and management of most health problems: Confident.   Patient Self Assessment of Health Status: Good, for my age.  Tobacco use: Non-User   Alcohol use: Non-User   Illicit drug use: Non-User   Current diet: well balanced diet, does not consume adequate fluids and does not consume caffeine.   Exercise Frequency: the patient does not exercise.   Depression/Suicide Screening:  .   During the past 2 weeks, the patient has not felt down, depressed or hopeless.    During the past 2 weeks, the patient has not felt little interest or pleasure in doing things.    Hearing Impairment: Patient has no hearing impairments   Cognitive Impairment:   Mini-Cog 0/5.   Bathing: needs assistance.   Dressing: needs assistance.   Walking: performs independently.   Toileting: performs independently.   Feeding: performs independently.   Personal  Hygiene: needs assistance.   Bowels: continent.   Bladder: occasional accident.   Managing Finances: dependent.   Shopping: dependent.   Managing Medications: dependent.   Housework / Basic Home Maintenance: dependent.   Preparing Meals: dependent.   Using the Telephone/ Communication Devices: dependent.       There are spiritual/cultural practices/values/needs that are important to know-Anabaptism    Pain Scale: On a scale of 0 to 10, the patient rates the pain at 0 .but occasional pain in knees.  Please identify location of pain: knees   Pain Quality: aching, but not present today   The pain makes it hard for the patient to do these things: walking.   Advance directives: I evaluated the patient and determined the patient's capacity to understand the risks, benefits, alternatives to specific treatment. I elicited the patient's values and goals for treatment. I reviewed advance directives .  Living Will: No living will on file.   Healthcare POA: Health care proxy on file.None  Reynold has been provided forms in the past  Tobacco Screening: Patrica does not use tobacco.   Diabetes Evaluation: A1c-no recent value  Goal is HgBA1c 7% or less .   Blood Pressure monitoring   Blood Pressure Controlled, Systolic = 102 mm Hg   Blood Pressure Controlled, Diastolic low end today at 56 mm Hg   Domestic Violence Screen: Does not feel threatened or abused physically, emotionally or sexually.   Do you feel UNSAFE?   The patient feels safe in the home.   Depression/Suicide Screening:   During the past 2 weeks, the patient has not felt down, depressed or hopeless.    During the past 2 weeks, the patient has not felt little interest or pleasure in doing things.    Single alcohol screening question:   In the past year the patient has had 5 or more drinks (men) or 4 or more drinks (women)? 0 time(s).   Single substance abuse screening question:   In the past year the patient has used a recreational drug or used a prescription drug for  non-medical reasons? 0 time(s).   The patient is not comfortable filling out medical forms.   Immunizations:  -Flu vaccine 2023    Covid-19 SARS-COV-2 =2019  -Pneumovax 23 -2022  -Prevnar 13 -2019  -Shingles Vaccine-2006   -Tdap-10/10/2019  -HEP B-1986  -Hep A -96     Key Learner(s) are identified by the patient as person(s) most likely to participate in providing care, such as managing medications or taking them to doctors' appointments.   Name of Villatoro Learner: Reynold (son)    Primary Language for learning: English.    Advanced Care Planning:  -No living will, DPOAHC or advanced directive  -Patient wishes to be resuscitated. Son agrees.   -Extensive discussion held- will need to be revisited at a later date   The following medical issues were deemed to be medically necessary and were also addressed at today's visit:     ELISHA-  Reynold states patient continues to awaken around 2-3 AM.  +Hallucinations of  family members. Experiences are pleasant and not distressing to patient. This is not a new complaint   Sees and talks to them. (Discussed possible Lewy Body Dementia)    Insomnia-  He could not find a low dose Melatonin so did not start  But continues not to sleep throughout the night  She is awake and engaged during the day    Hypotension  DBP is low today   No headache or blurred vision  No chest or sob  Does not like to drink water  He has limited coffee to once a day and it is decaffeinated  He feels that urine is dark some days     Physical Exam  Ht: 5 feet 1 inch  Wt: 99.6 lbs   GENERAL APPEARANCE: A&O x 2, thin, NAD. .  HEAD: normocephalic, atraumatic.     THROAT: Oral cavity and pharynx normal. No inflammation, swelling, exudate, or lesions.  MMM. Tongue pink and midline. No thrush. Missing teeth.  Eating - no coughing, choking or difficulty swallowing     NECK: Neck supple, non-tender without lymphadenopathy, masses or thyromegaly.     CARDIAC: Normal S1 and S2. No S3, S4  "or murmurs. Rhythm is regular. There is no peripheral edema, cyanosis or pallor. Extremities are warm and well perfused. No carotid bruits.     LUNGS: Clear to auscultation bilaterally without rales, rhonchi, wheezing or diminished breath sounds. No cough.     ABDOMEN: Positive bowel sounds. Soft, nondistended, nontender. No guarding or rebound. + hernia right side of abdomen, nontender, BS normoactive x 4 , and wearing a diaper.      EXTREMITIES: Gait unsteady. Slight contractures to knees Peripheral pulses intact. No varicosities.     SKIN: Skin normal color, texture and turgor with no lesions or rash.      NEURO: No tremors, speech clear, follows commands, ST memory impaired, and LT memory intact.-More lucid and accurate with recall at times.      PSYCHIATRIC: oriented to person & place.  Without hallucinations, abnormal affect or abnormal behaviors during the examination. Patient is not suicidal.                 Chemistry    Lab Results   Component Value Date/Time     11/09/2023 1406    K 3.8 11/09/2023 1406     11/09/2023 1406    CO2 27 11/09/2023 1406    BUN 20 11/09/2023 1406    CREATININE 0.62 11/09/2023 1406    Lab Results   Component Value Date/Time    CALCIUM 8.6 11/09/2023 1406    ALKPHOS 119 11/09/2023 1406    AST 23 11/09/2023 1406    ALT 12 11/09/2023 1406    BILITOT 0.6 11/09/2023 1406        Lab Results   Component Value Date    WBC 19.3 (H) 11/09/2023    HGB 10.5 (L) 11/09/2023    HCT 37.5 11/09/2023    MCV 83 11/09/2023    PLT 90 (L) 11/09/2023     No results found for: \"HGBA1C\"!C    ASSESSEMENT/PLAN  Medicare Annual Wellness Visit Subsequent  -completed  -Needs flu vaccine  -Will bring to next visit if still available     ELISHA (generalized anxiety disorder)  -poorly controlled   -start  - escitalopram (Lexapro) 5 mg tablet; Take 1 tablet (5 mg) by mouth once daily.  Dispense: 90 tablet; Refill: 2    3. Insomnia, unspecified type  -poorly controlled  -issues with sleep maintenance  -May " use Melatonin Children's Gummies  -Start with 3 mg and may titrate up to 5 mg nightly  -plan of care is ongoing      4. Hypotension, unspecified hypotension type  -DBP 56  -does not like to drink water  -plan is to start with adding 4 ounces more daily and increase to 8 ounces  -plan of care is ongoing    5.Advance care Planning-goals of care  -I spent 18 minutes in discussion of goals of care  -Patient and son would like her to be resuscitated  -Will continue ongoing discussions      -Stable  -Routine follow up in one month or sooner

## 2024-01-10 NOTE — PROGRESS NOTES
"Subjective   Reason for Visit: Patrica Mancera is an 89 y.o. female here for a Medicare Wellness visit-subsequent         Reviewed all medications by prescribing practitioner or clinical pharmacist (such as prescriptions, OTCs, herbal therapies and supplements) and documented in the medical record.    HPIL Mrs. Mancera is being seen today in her son's home, Reynold, (also present ) for Medicare Annual Wellness Visit-subsequent     Patient Care Team:  MALIHA Metzger as PCP - General (Gerontology)  MALIHA Metzger as Nurse Practitioner (Gerontology)     Review of Systems    Objective   Vitals:  ND=381/56  Pulse=79  Resp=18  Temp=98.5 F (temporal)  Height: 5'1\"  Weight:101 lbs   BMI:25.9    Physical Exam    Assessment/Plan   Problem List Items Addressed This Visit    None         "

## 2024-01-15 VITALS
RESPIRATION RATE: 18 BRPM | SYSTOLIC BLOOD PRESSURE: 102 MMHG | TEMPERATURE: 98.5 F | WEIGHT: 99.7 LBS | DIASTOLIC BLOOD PRESSURE: 56 MMHG | HEART RATE: 79 BPM | BODY MASS INDEX: 18.23 KG/M2

## 2024-01-15 PROBLEM — Z71.89 ADVANCED CARE PLANNING/COUNSELING DISCUSSION: Status: ACTIVE | Noted: 2024-01-15

## 2024-01-15 PROBLEM — I95.9 HYPOTENSION: Status: ACTIVE | Noted: 2024-01-15

## 2024-01-15 PROBLEM — F41.1 GAD (GENERALIZED ANXIETY DISORDER): Status: ACTIVE | Noted: 2024-01-15

## 2024-01-15 PROBLEM — Z00.00 ROUTINE GENERAL MEDICAL EXAMINATION AT HEALTH CARE FACILITY: Status: ACTIVE | Noted: 2024-01-15

## 2024-01-15 NOTE — PATIENT INSTRUCTIONS
Dear Frank Calderon,    It was a pleasure seeing you today    I will follow up with you in the next 4-5 weeks.    Sincerely,    Mary Jo Bartlett, MSN, APRN-BC

## 2024-02-13 ENCOUNTER — OFFICE VISIT (OUTPATIENT)
Dept: GERIATRIC MEDICINE | Facility: CLINIC | Age: 89
End: 2024-02-13
Payer: MEDICARE

## 2024-02-13 DIAGNOSIS — F41.1 GAD (GENERALIZED ANXIETY DISORDER): ICD-10-CM

## 2024-02-13 DIAGNOSIS — R44.3 HALLUCINATIONS: ICD-10-CM

## 2024-02-13 DIAGNOSIS — G47.00 INSOMNIA, UNSPECIFIED TYPE: ICD-10-CM

## 2024-02-13 DIAGNOSIS — R51.9 INTRACTABLE HEADACHE, UNSPECIFIED CHRONICITY PATTERN, UNSPECIFIED HEADACHE TYPE: ICD-10-CM

## 2024-02-13 DIAGNOSIS — F03.90 MAJOR NEUROCOGNITIVE DISORDER (MULTI): Primary | ICD-10-CM

## 2024-02-13 PROCEDURE — 1036F TOBACCO NON-USER: CPT | Performed by: NURSE PRACTITIONER

## 2024-02-13 PROCEDURE — 99349 HOME/RES VST EST MOD MDM 40: CPT | Performed by: NURSE PRACTITIONER

## 2024-02-13 PROCEDURE — 1159F MED LIST DOCD IN RCRD: CPT | Performed by: NURSE PRACTITIONER

## 2024-02-13 PROCEDURE — 3074F SYST BP LT 130 MM HG: CPT | Performed by: NURSE PRACTITIONER

## 2024-02-13 PROCEDURE — 1160F RVW MEDS BY RX/DR IN RCRD: CPT | Performed by: NURSE PRACTITIONER

## 2024-02-13 PROCEDURE — 3078F DIAST BP <80 MM HG: CPT | Performed by: NURSE PRACTITIONER

## 2024-02-13 PROCEDURE — 1126F AMNT PAIN NOTED NONE PRSNT: CPT | Performed by: NURSE PRACTITIONER

## 2024-02-13 ASSESSMENT — PAIN SCALES - GENERAL: PAINLEVEL: 0-NO PAIN

## 2024-02-18 VITALS
WEIGHT: 99 LBS | SYSTOLIC BLOOD PRESSURE: 118 MMHG | RESPIRATION RATE: 18 BRPM | HEART RATE: 73 BPM | BODY MASS INDEX: 18.1 KG/M2 | DIASTOLIC BLOOD PRESSURE: 60 MMHG

## 2024-02-18 PROBLEM — R51.9 INTRACTABLE HEADACHE: Status: ACTIVE | Noted: 2024-02-18

## 2024-02-18 PROBLEM — F03.90 MAJOR NEUROCOGNITIVE DISORDER (MULTI): Status: ACTIVE | Noted: 2024-02-18

## 2024-02-18 PROBLEM — R44.3 HALLUCINATIONS: Status: ACTIVE | Noted: 2024-02-18

## 2024-02-18 RX ORDER — ACETAMINOPHEN 500 MG
5 TABLET ORAL NIGHTLY PRN
COMMUNITY
End: 2024-04-15 | Stop reason: ALTCHOICE

## 2024-02-18 NOTE — PATIENT INSTRUCTIONS
Dear Frank Lemust,    It was a pleasure seeing you today.      Increase your Melatonin to 3 mg nightly.    I will follow up with you in 2 months or sooner if needed.    <DBEND

## 2024-02-18 NOTE — PROGRESS NOTES
"    Some elements may have been copied from prior note(s). The elements have been updated and reflect current evaluation, examination and decision making from today.           Summary Statement: Mrs. Mancera is a 89 yo elderly woman with a PMH significant form COPD/asthma, HFpEF (60%), HTN, Chronic compression fracture T6, polycythemia vera,  subdural hematoma (),  TIA, Dementia, hallucinations,  COVID 19 virus  with acute hypoxic respiratory  failure, (2022)  chronic leukocytosis, anemia, Lymphadenopathy concerning for lymphoma , Splenomegaly, PCV JAK2 V617F+,  UTI, weight loss,  polycythemia vera, and impaired gait     PSH:  hysterectomy () and cataract surgery     Reason for homebound status: leaving the home requires considerable taxing effort due impaired gait and the assistance of another person due to cognitive impairment      Reason for visit:  Follow up for insomnia and management of chronic active illnesses     HPI: Mrs. Mancera is being seen today at home in the presence of her son, Reynold who is her primary caregiver, and her HHA  She is a poor historian due to dementia.      Son states that in the interim patient continues to have difficulty sleeping.  States he has been giving 1/2 of a 3 mg Melatonin Gummy.    States she continues to have hallucinations at night sometimes, but \"reading scriptures to her  Calms her down immediately\".  States they are not bothersome to patient.  Sees and talks to her  mother.     She developed diarrhea after eating peanuts.  Resolved once discontinued.  No blood in stool or urine.     Medications:  Current Outpatient Medications on File Prior to Visit   Medication Sig Dispense Refill    albuterol 2.5 mg /3 mL (0.083 %) nebulizer solution Inhale 3 mL every 6 hours if needed.      escitalopram (Lexapro) 5 mg tablet Take 1 tablet (5 mg) by mouth once daily. 90 tablet 2    melatonin 5 mg tablet Take 1 tablet (5 mg) by mouth as needed at bedtime for " sleep.      omeprazole (Acid Reducer, omeprazole,) 20 mg capsule,delayed release(DR/EC) Take 1 capsule (20 mg) by mouth once daily in the morning. Take before meals. 90 capsule 3    pyridoxine (Vitamin B-6) 100 mg tablet Take 1 tablet (100 mg) by mouth once daily. 90 tablet 3    carboxymethylcellulose PF (Artificial Tears, cmc,) 1 % ophthalmic solution Administer into affected eye(s).      [DISCONTINUED] melatonin 3 mg capsule Take 1 capsule by mouth as needed at bedtime (insomnia).       No current facility-administered medications on file prior to visit.   Melatonin 1.5 mg ( 1/2 of 3 mg Gummy) nightly     ROS:  Unable to obtain due to cognitive impairment  States sometimes she has a headache.  Son states she has never complained of this  Appetite is good  No falls/ED visits or hospitalizations  No cough, cold or flu-like symptoms  Wears diapers for occasional incontinence     Physical Exam      Chemistry    Lab Results   Component Value Date/Time     11/09/2023 1406    K 3.8 11/09/2023 1406     11/09/2023 1406    CO2 27 11/09/2023 1406    BUN 20 11/09/2023 1406    CREATININE 0.62 11/09/2023 1406    Lab Results   Component Value Date/Time    CALCIUM 8.6 11/09/2023 1406    ALKPHOS 119 11/09/2023 1406    AST 23 11/09/2023 1406    ALT 12 11/09/2023 1406    BILITOT 0.6 11/09/2023 1406        Lab Results   Component Value Date    WBC 19.3 (H) 11/09/2023    HGB 10.5 (L) 11/09/2023    HCT 37.5 11/09/2023    MCV 83 11/09/2023    PLT 90 (L) 11/09/2023       ASSESSMENT/PLAN   1.  Insomnia, unspecified type  - poorly controlled on Melatonin 1.5 mg nightly  -Advised to increase to 3 mg nightly    2. Major neurocognitive disorder/Hallucinations  -discussed possible Lewy Body Dementia  -Not bothersome to patient  -Reading scriptures from the Holy Bible to her relaxes her immediately per Reynold  -Advised to continue  -Plan of care is ongoing       3. Intractable headache, unspecified chronicity pattern, unspecified  headache type    -patient is a poor historian due to cognitive impairment  -Son states she has never complained of this  -He will see if she does in the interim     4. ELISHA  -continue Escitalopram 5 mg daily       Stable  -Routine follow up in 2 months

## 2024-04-15 ENCOUNTER — OFFICE VISIT (OUTPATIENT)
Dept: GERIATRIC MEDICINE | Facility: CLINIC | Age: 89
End: 2024-04-15
Payer: MEDICARE

## 2024-04-15 VITALS
HEART RATE: 78 BPM | SYSTOLIC BLOOD PRESSURE: 118 MMHG | WEIGHT: 98 LBS | DIASTOLIC BLOOD PRESSURE: 60 MMHG | BODY MASS INDEX: 17.92 KG/M2 | RESPIRATION RATE: 18 BRPM

## 2024-04-15 DIAGNOSIS — N39.42 INCONTINENCE WITHOUT SENSORY AWARENESS: ICD-10-CM

## 2024-04-15 DIAGNOSIS — M17.0 OSTEOARTHRITIS OF BOTH KNEES, UNSPECIFIED OSTEOARTHRITIS TYPE: ICD-10-CM

## 2024-04-15 DIAGNOSIS — R19.5 LOOSE STOOLS: ICD-10-CM

## 2024-04-15 DIAGNOSIS — R32 URINARY INCONTINENCE, UNSPECIFIED TYPE: ICD-10-CM

## 2024-04-15 DIAGNOSIS — R44.3 HALLUCINATIONS: ICD-10-CM

## 2024-04-15 DIAGNOSIS — G47.00 INSOMNIA, UNSPECIFIED TYPE: Primary | ICD-10-CM

## 2024-04-15 DIAGNOSIS — F03.90 MAJOR NEUROCOGNITIVE DISORDER (MULTI): ICD-10-CM

## 2024-04-15 DIAGNOSIS — F41.1 GAD (GENERALIZED ANXIETY DISORDER): ICD-10-CM

## 2024-04-15 PROCEDURE — 3078F DIAST BP <80 MM HG: CPT | Performed by: NURSE PRACTITIONER

## 2024-04-15 PROCEDURE — 1159F MED LIST DOCD IN RCRD: CPT | Performed by: NURSE PRACTITIONER

## 2024-04-15 PROCEDURE — 1126F AMNT PAIN NOTED NONE PRSNT: CPT | Performed by: NURSE PRACTITIONER

## 2024-04-15 PROCEDURE — 1160F RVW MEDS BY RX/DR IN RCRD: CPT | Performed by: NURSE PRACTITIONER

## 2024-04-15 PROCEDURE — 99349 HOME/RES VST EST MOD MDM 40: CPT | Performed by: NURSE PRACTITIONER

## 2024-04-15 PROCEDURE — 3074F SYST BP LT 130 MM HG: CPT | Performed by: NURSE PRACTITIONER

## 2024-04-15 RX ORDER — TALC
3 POWDER (GRAM) TOPICAL NIGHTLY PRN
COMMUNITY

## 2024-04-15 ASSESSMENT — PAIN SCALES - GENERAL: PAINLEVEL: 0-NO PAIN

## 2024-04-16 PROBLEM — R32 URINARY INCONTINENCE: Status: ACTIVE | Noted: 2024-04-16

## 2024-04-16 PROBLEM — N39.42 INCONTINENCE WITHOUT SENSORY AWARENESS: Status: ACTIVE | Noted: 2024-04-16

## 2024-04-17 NOTE — PROGRESS NOTES
"Some elements may have been copied from prior note(s). The elements have been updated and reflect current evaluation, examination and decision making from today.           Reason for visit: Insomnia    Summary Statement: Mrs. Mancera is a 89 yo elderly woman with a PMH significant form COPD/asthma, HFpEF (60%), HTN, Chronic compression fracture T6, polycythemia vera,  subdural hematoma (2019),  TIA, Dementia, hallucinations,  COVID 19 virus  with acute hypoxic respiratory  failure, (11/2022)  chronic leukocytosis, anemia, Lymphadenopathy concerning for lymphoma , Splenomegaly, PCV JAK2 V617F+,  UTI, weight loss,  polycythemia vera, and impaired gait     PSH:  hysterectomy ('60's) and cataract surgery     Reason for homebound status: leaving the home requires considerable taxing effort due impaired gait and the assistance of another person due to cognitive impairment      Reason for visit:  Follow up for insomnia and management of chronic active illnesses     HPI: Mrs. Mancera is being seen today at home in the presence of her son, Reynold who is her primary caregiver, and her HHA  She is a poor historian due to dementia.   She denies any pain or discomfort.    Reynold states that in the interim , she did not want to take the Melatonin.  Feels that \"you all\" not going to harm me with all this medication\".   States he did notice that when she did take it , \"it helped\".  He also feels that the Escitalopram has \"helped a lot\".   States talking excessively at night continues.  Also, visual and auditory hallucinations at night.  They are not bothersome to patient. Once again, he reports that when he reads the Holy Bible to her, it relaxes her.       States patient's appetite remains very good  Reports patient's stools are loose.  HHA confirms  Patient denies abdominal pain or discomfort.  She denies nausea or vomiting  Reynold believes it is due to patient has been eating a lot of peanuts lately   She endorses phlegm in " throat.     ROS:  Unable to obtain due to cognitive impairment     Physical Exam      Lab Results      Assessment/Plan  1. Insomnia, unspecified type  -associated with dementia with behavior disturbance  -encouraged patient to take Melatonin 3 mg at bedtime  -Son reports good results when she did take it  -Plan of care is ongoing     2. ELISHA (generalized anxiety disorder)  - continue Escitalopram 5 mg daily  -NP offered to increase   -Reynold would like to continue current dosage     3. Major neurocognitive disorder/Hallucinations  - possibly Lewy Body       4. Urinary incontinence, unspecified type/ Incontinence without sensory awareness  -secondary to cognitive impairment and loose stools  -continue to wear Diapers as needed.     5. Loose stools  -new onset  -possibly due to peanuts  -Reynold will decrease patient's consumption  -Reassess in one month     Stable  Follow up in one month     4/25/2024  Son called our office yesterday to report that patient fell OOB.No apparent injury . He is requesting a hospital bed:  A hospital bed is medically necessary due to the following reasons.        1. Patient has a a history of Major Neuro Cognitive Disorder and OA of both knees. She requires requires positioning that an ordinary bed can't accommodate.    2. Patient requires body positioning in ways an ordinary bed can't accommodate to alleviate pain in her knees secondary to osteoarthritis.     3. Patient requires head of the bed to be elevated greater than 30 degrees most of the time due to her Cognitive Impairment to minimize the risk of aspiration.   Pillows and wedges have not proven to be effective.        4. Patient needs a bed height different than the ordinary bed to assist with  transfers to a standing position.     5. Patient has a frequent need to change body position to minimize the risk of developing pressure sores.     NP will fax script and chart notes to [facility].

## 2024-04-21 PROBLEM — R19.5 LOOSE STOOLS: Status: ACTIVE | Noted: 2024-04-21

## 2024-04-22 NOTE — PATIENT INSTRUCTIONS
Dear Mrs. Mancera,  It was a pleasure seeing you today.    I encouraged you to take the Melatonin for sleep.  I can assure you that it is safe to taken, and we have you on a very low dose.       Try to cut back or eliminate the peanuts to see if your loose stools improve.  I will follow up with you in one month.   Sincerely,    Mary Jo Bartlett, MSN, APRN-BC

## 2024-05-03 ENCOUNTER — TELEPHONE (OUTPATIENT)
Dept: GERIATRIC MEDICINE | Facility: CLINIC | Age: 89
End: 2024-05-03

## 2024-07-10 ENCOUNTER — APPOINTMENT (OUTPATIENT)
Dept: GERIATRIC MEDICINE | Facility: CLINIC | Age: 89
End: 2024-07-10
Payer: COMMERCIAL

## 2024-07-26 ENCOUNTER — OFFICE VISIT (OUTPATIENT)
Dept: GERIATRIC MEDICINE | Facility: CLINIC | Age: 89
End: 2024-07-26
Payer: MEDICARE

## 2024-07-26 DIAGNOSIS — F03.90 MAJOR NEUROCOGNITIVE DISORDER (MULTI): ICD-10-CM

## 2024-07-26 DIAGNOSIS — R63.4 WEIGHT LOSS, UNINTENTIONAL: ICD-10-CM

## 2024-07-26 DIAGNOSIS — D64.9 ANEMIA, UNSPECIFIED TYPE: ICD-10-CM

## 2024-07-26 DIAGNOSIS — E53.1 VITAMIN B6 DEFICIENCY: ICD-10-CM

## 2024-07-26 DIAGNOSIS — D72.829 LEUKOCYTOSIS, UNSPECIFIED TYPE: ICD-10-CM

## 2024-07-26 DIAGNOSIS — D69.6 THROMBOCYTOPENIA (CMS-HCC): ICD-10-CM

## 2024-07-26 DIAGNOSIS — I44.4 LEFT ANTERIOR FASCICULAR BLOCK: ICD-10-CM

## 2024-07-26 DIAGNOSIS — F41.1 GAD (GENERALIZED ANXIETY DISORDER): Primary | ICD-10-CM

## 2024-07-26 DIAGNOSIS — R41.82 ACUTE ON CHRONIC ALTERATION IN MENTAL STATUS: ICD-10-CM

## 2024-07-26 DIAGNOSIS — I50.30 HEART FAILURE WITH PRESERVED EJECTION FRACTION, UNSPECIFIED HF CHRONICITY (MULTI): ICD-10-CM

## 2024-07-26 DIAGNOSIS — R19.5 LOOSE STOOLS: ICD-10-CM

## 2024-07-26 PROCEDURE — 1160F RVW MEDS BY RX/DR IN RCRD: CPT | Performed by: NURSE PRACTITIONER

## 2024-07-26 PROCEDURE — 99349 HOME/RES VST EST MOD MDM 40: CPT | Performed by: NURSE PRACTITIONER

## 2024-07-26 PROCEDURE — 1159F MED LIST DOCD IN RCRD: CPT | Performed by: NURSE PRACTITIONER

## 2024-07-29 ENCOUNTER — HOME HEALTH ADMISSION (OUTPATIENT)
Dept: HOME HEALTH SERVICES | Facility: HOME HEALTH | Age: 89
End: 2024-07-29
Payer: MEDICARE

## 2024-07-29 PROBLEM — R41.82 ACUTE ON CHRONIC ALTERATION IN MENTAL STATUS: Status: ACTIVE | Noted: 2024-07-29

## 2024-07-29 PROBLEM — D64.9 ANEMIA: Status: ACTIVE | Noted: 2024-07-29

## 2024-07-29 PROBLEM — I50.30 HEART FAILURE WITH PRESERVED EJECTION FRACTION (MULTI): Status: ACTIVE | Noted: 2024-07-29

## 2024-07-29 PROBLEM — D69.6 THROMBOCYTOPENIA (CMS-HCC): Status: ACTIVE | Noted: 2024-07-29

## 2024-07-29 PROBLEM — D72.829 LEUKOCYTOSIS: Status: ACTIVE | Noted: 2024-07-29

## 2024-07-29 PROBLEM — E53.1 VITAMIN B6 DEFICIENCY: Status: ACTIVE | Noted: 2024-07-29

## 2024-07-29 PROBLEM — I44.4 LEFT ANTERIOR FASCICULAR BLOCK: Status: ACTIVE | Noted: 2024-07-29

## 2024-07-29 RX ORDER — DONEPEZIL HYDROCHLORIDE 5 MG/1
TABLET, FILM COATED ORAL
Qty: 90 TABLET | Refills: 3 | Status: SHIPPED | OUTPATIENT
Start: 2024-07-29

## 2024-07-29 RX ORDER — ESCITALOPRAM OXALATE 10 MG/1
10 TABLET ORAL DAILY
Qty: 90 TABLET | Refills: 3 | Status: SHIPPED | OUTPATIENT
Start: 2024-07-29 | End: 2025-07-29

## 2024-07-29 NOTE — PROGRESS NOTES
"Some elements may have been copied from prior note(s). The elements have been updated and reflect current evaluation, examination and decision making from today.           Reason for visit:  Follow up for ELISHA and management of chronic active illnesses.     Summary Statement: Mrs. Mancera is a 89 yo elderly woman with a PMH significant form COPD/asthma, HFpEF (60%), HTN, Chronic compression fracture T6, polycythemia vera,  subdural hematoma (2019),  TIA, Dementia, hallucinations,  COVID 19 virus  with acute hypoxic respiratory  failure, (11/2022)  chronic leukocytosis, anemia, Lymphadenopathy concerning for lymphoma , Splenomegaly, PCV JAK2 [id number]+,  UTI, weight loss,  polycythemia vera, and impaired gait     PSH:  hysterectomy ('60's) and cataract surgery     Reason for homebound status: leaving the home requires considerable taxing effort due impaired gait and the assistance of another person due to cognitive impairment           HPI: Mrs. Mancera is being seen today at home in the presence of her son, Reynold who is her primary caregiver, and her HHA  She is a poor historian due to dementia.   She denies any pain or discomfort.    Reynold is asking if Escitalopram can be increased. Continues to have issues with ELISHA  States she is having more issues with hallucinations.  He wonders if she may have an UTI.   Patient states she feels fine except, yesterday, I ate 2 pieces of chocolate cake and it gave me diarrhea\".  No other associated symptoms      ROS:  Unable to obtain due to cognitive impairment   Denies any pain  She recalled event of loose stools yesterday accurately       Physical Exam   General appearance: Alert, cooperative and in no acute distress. Thin. Sitting upright on side of bed   Head and Face   Head and face: Normal.    Eyes   Inspection of eyes: Sclera and conjunctiva were normal.  Eyelids:   Pupil exam: PERRLA. Extraocular movements were intact.   Ears, Nose, Mouth, and Throat   Ears: External: " "Normal  Oropharynx: Normal with moist mucus membranes, tongue midline, no PND, no erythema or enlargement of tonsils.  Hearing: Normal  Nasal mucosa, septum, and turbinates: Normal   Normal without edema or erythema.   Lips, teeth, and gums: abnormal. Poor dentition   Neck   Neck Exam: Appearance of the neck was normal. No neck masses observed. No jugular vein distension.   Pulmonary   Respiratory assessment: No respiratory distress, normal respiratory rhythm and effort.  Palpation of Chest: Normal.    Cardiovascular   Auscultation of heart: Apical pulse normal, heart rate and rhythm normal, normal S1 and S2, no murmurs, no gallops and no pericardial rub.  Examination of extremities for edema and/or varicosities:    Chest   Chest: Symmetrical and normal appearance.   Abdomen   Abdominal Exam: No bruits normal bowel sounds, soft, non-tender, no abdominal masses palpated.   Bowel sounds hypoactive x 4.   Genitourinary   Bladder: deferred   Lymphatic   Palpation of lymph nodes in axillae: Normal.    No cervical lymphadenopathy  No supraclavicular LAD  Musculoskeletal   Inspection/palpation of joints, bones, and muscles:   Range of motion: Abnormal.-slight decreased to left shoulder  Muscle strength/tone: Normal. RUE/LUE 5/5 RLE/LLE 5/5   Gait: steady   Skin   Skin inspection: Skin dry, warm and intact.   Trophic changes to BLE     Neurologic   Cranial nerves: CNII-XII intact   Psychiatric   Judgment and insight: Intact and appropriate   Orientation: Oriented to person and place.   Mood and affect: Normal  Recent and remote memory: Poor LT memory         PREVIOUS DOCUMENTATION Altered mental status with hallucinations, baseline dementia\" resolved, back to baseline   #Agitation - resolved  - likely related to UTI.    -Previous mentation documented to be AOx2-3 (November 2022),  now at baseline.   -CT of the head w/extensive white matter changes, remote infarcts, volume loss  -UA +ve  started on zosyn and completed 5 days " empirically given clinical improvement.   -Olanzapine 2.5mg as needed for agitation.   -Falls precautions  -PT/OT evaluation recommending mod intensity therapy and 24/7 supervision.       # h/o PCV, concern for MPD  # chronic leukocytosis possible CLL asymptomatic  # Chronic thrombocytopenia, anemia  - followed by heme, h/o PCV, recent work up including peripheral flow showed left shift but no blasts and no lymphoprolif process identified. pt did not want to do BMx, not candidate for hydroxyurea.  - pt will need hematology follow up to evaluate.         #HFpEF  -Daily weights  -No SOB or hypoxia noted     #COPD  -Not in acute exacerbation  -No recent medication fills noted     #Remote SDH  - fall precautions  - pt/ot assessment       LABS/DIAGNOSTIC IMAGING  Electrocardiogram    Height: Not recorded   Weight: Not recorded   Blood Pressure: Not recorded    Date of Study: 02/28/2023   Ordering Provider: VERONICA Muñoz-CNP   Clinical Indications: None Listed       Reading Physicians  Performing Staff    [employer]version    No performing staff assigned to study.     Indications  Priority: Routine  Not on file     Interpretation Summary    Please see ED Provider Note for formal interpretation  Confirmed by Lizzy Fermin ([id number]) on 11/22/2022 1:36:25 AM  Measurements    P Axis 70 degrees         P Offset 203 ms         NE Interval 142 ms         Q Onset 219 ms         QTC Calculation(Bazett) 435 ms         QTC Fredericia 408 ms         R Axis -62 degrees          T Axis 47 degrees         Ventricular Rate 89 BPM         Atrial Rate 89 BPM         P Onset 148 ms          QRS Count 14 beats         QRS Duration 84 ms         QT Interval 358 ms         T Offset 398 ms              MUSE NX Link     Show images for Electrocardiogram 12 Lead  Exam Details    Performed Procedure Technologist Supporting Staff Performing Physician   Electrocardiogram            Appointment Date/Status Modality Department                       End Exam      11/21/2022  1:24 PM                  ASSESSMENT/PLAN  ELISHA (generalized anxiety disorder)-   -Poorly controlled-worse in the evening at at night  -Previously discussions son did not want to start an antipsychotic  -Patient did tolerate Olanzapine 2.5 mg in the past during an acute hospitalization for delilrium secondarty to an UTI  -Will consider in the future  -Will increase to Escitalopram (Lexapro) 10 mg tablet; Take 1 tablet (10 mg) by mouth once daily. (STOP taking the 5 mg tablet daily)  Dispense: 90 tablet; Refill: 3 per son's request   -Will obtain baseline EKG as will be starting Donepezil   -Will also check UA/C+S- UTI was cause of this in 2023 during hospitalization     2. Major neurocognitive disorder (Multi)  -Start Donepezil  -See # 1   - Comprehensive metabolic panel; Future  - donepezil (Aricept) 5 mg tablet; Take one tablet by mouth in the evening or at bedtime  Dispense: 90 tablet; Refill: 3      3. Vitamin B6 deficiency  -currently on supplementation  -check level     4. Anemia, unspecified type  -son would like for Vit B12 level to be check-patient was on med in the past  - CBC and Auto Differential; Future    5. Weight loss, unintentional  - Comprehensive metabolic panel; Future  - Prealbumin; Future    6. Heart failure with preserved ejection fraction, unspecified HF chronicity (Multi)  -documented in PMH- asymptomatic  -Would not order an ECHO at this time   - B-type natriuretic peptide; Future    7. Thrombocytopenia (CMS-HCC)  -check CBC     8. Leukocytosis, unspecified type  -chronic, concern for CLL per previous documentation  Which also stated, h/o PCV, concern for MPD  # chronic leukocytosis possible CLL asymptomatic  # Chronic thrombocytopenia, anemia  - followed by heme, h/o PCV, recent work up including peripheral flow showed left shift but no blasts and no lymphoprolif process identified. pt did not want to do BMx, not candidate for hydroxyurea.  - pt will need  hematology follow up to evaluate.   -Discussed with son in the past  -Did not desire further workup     - check CBC and Auto Differential; Future    9. Left anterior fascicular block  -will obtain as a baseline for Donepezil and Escitalopram to assess QT c interval   - ECG 12 lead (Ancillary Performed); Future    10. Loose stools  -one time episode after eating 2 pieces of chocolate cake.  -Resolved.     Stable  Routine follow up in 2 months

## 2024-07-31 NOTE — PATIENT INSTRUCTIONS
Dear Mrs. Mancera,  It was a pleasure seeing you today.     Will increase Escitalpram  Will start Donepezil  Will obtain baseline EKG  Will obtain labs and UA , C+S    NP will follow up in 2 months    Sincerely,    Mary Jo Bartlett, MSN, APRN-BC

## 2024-08-01 ENCOUNTER — LAB (OUTPATIENT)
Dept: LAB | Facility: LAB | Age: 89
End: 2024-08-01
Payer: COMMERCIAL

## 2024-08-01 ENCOUNTER — HOME CARE VISIT (OUTPATIENT)
Dept: HOME HEALTH SERVICES | Facility: HOME HEALTH | Age: 89
End: 2024-08-01
Payer: MEDICARE

## 2024-08-01 DIAGNOSIS — R63.4 WEIGHT LOSS, UNINTENTIONAL: ICD-10-CM

## 2024-08-01 DIAGNOSIS — D64.9 ANEMIA, UNSPECIFIED TYPE: ICD-10-CM

## 2024-08-01 DIAGNOSIS — E53.1 VITAMIN B6 DEFICIENCY: ICD-10-CM

## 2024-08-01 DIAGNOSIS — D72.829 LEUKOCYTOSIS, UNSPECIFIED TYPE: ICD-10-CM

## 2024-08-01 DIAGNOSIS — I50.30 HEART FAILURE WITH PRESERVED EJECTION FRACTION, UNSPECIFIED HF CHRONICITY (MULTI): ICD-10-CM

## 2024-08-01 DIAGNOSIS — F03.90 MAJOR NEUROCOGNITIVE DISORDER (MULTI): ICD-10-CM

## 2024-08-01 LAB
ALBUMIN SERPL BCP-MCNC: 3.6 G/DL (ref 3.4–5)
ALP SERPL-CCNC: 95 U/L (ref 33–136)
ALT SERPL W P-5'-P-CCNC: 13 U/L (ref 7–45)
ANION GAP SERPL CALC-SCNC: 15 MMOL/L (ref 10–20)
AST SERPL W P-5'-P-CCNC: 22 U/L (ref 9–39)
BASOPHILS # BLD AUTO: 0.43 X10*3/UL (ref 0–0.1)
BASOPHILS NFR BLD AUTO: 2.9 %
BILIRUB SERPL-MCNC: 0.7 MG/DL (ref 0–1.2)
BNP SERPL-MCNC: 320 PG/ML (ref 0–99)
BUN SERPL-MCNC: 21 MG/DL (ref 6–23)
CALCIUM SERPL-MCNC: 8.4 MG/DL (ref 8.6–10.3)
CHLORIDE SERPL-SCNC: 103 MMOL/L (ref 98–107)
CO2 SERPL-SCNC: 27 MMOL/L (ref 21–32)
CREAT SERPL-MCNC: 0.66 MG/DL (ref 0.5–1.05)
EGFRCR SERPLBLD CKD-EPI 2021: 82 ML/MIN/1.73M*2
EOSINOPHIL # BLD AUTO: 0.48 X10*3/UL (ref 0–0.4)
EOSINOPHIL NFR BLD AUTO: 3.2 %
ERYTHROCYTE [DISTWIDTH] IN BLOOD BY AUTOMATED COUNT: 22.4 % (ref 11.5–14.5)
GLUCOSE SERPL-MCNC: 88 MG/DL (ref 74–99)
HCT VFR BLD AUTO: 35.8 % (ref 36–46)
HGB BLD-MCNC: 10.4 G/DL (ref 12–16)
IMM GRANULOCYTES # BLD AUTO: 2.29 X10*3/UL (ref 0–0.5)
IMM GRANULOCYTES NFR BLD AUTO: 15.4 % (ref 0–0.9)
LYMPHOCYTES # BLD AUTO: 1.23 X10*3/UL (ref 0.8–3)
LYMPHOCYTES NFR BLD AUTO: 8.3 %
MCH RBC QN AUTO: 24.1 PG (ref 26–34)
MCHC RBC AUTO-ENTMCNC: 29.1 G/DL (ref 32–36)
MCV RBC AUTO: 83 FL (ref 80–100)
MONOCYTES # BLD AUTO: 1.17 X10*3/UL (ref 0.05–0.8)
MONOCYTES NFR BLD AUTO: 7.9 %
NEUTROPHILS # BLD AUTO: 9.3 X10*3/UL (ref 1.6–5.5)
NEUTROPHILS NFR BLD AUTO: 62.3 %
NRBC BLD-RTO: 2 /100 WBCS (ref 0–0)
PLATELET # BLD AUTO: 75 X10*3/UL (ref 150–450)
POTASSIUM SERPL-SCNC: 3.7 MMOL/L (ref 3.5–5.3)
PREALB SERPL-MCNC: 8.7 MG/DL (ref 18–40)
PROT SERPL-MCNC: 6.8 G/DL (ref 6.4–8.2)
RBC # BLD AUTO: 4.32 X10*6/UL (ref 4–5.2)
SODIUM SERPL-SCNC: 141 MMOL/L (ref 136–145)
VIT B12 SERPL-MCNC: 742 PG/ML (ref 211–911)
WBC # BLD AUTO: 14.9 X10*3/UL (ref 4.4–11.3)

## 2024-08-01 PROCEDURE — 85025 COMPLETE CBC W/AUTO DIFF WBC: CPT

## 2024-08-01 PROCEDURE — 82607 VITAMIN B-12: CPT

## 2024-08-01 PROCEDURE — 80053 COMPREHEN METABOLIC PANEL: CPT

## 2024-08-01 PROCEDURE — 84207 ASSAY OF VITAMIN B-6: CPT

## 2024-08-01 PROCEDURE — 83880 ASSAY OF NATRIURETIC PEPTIDE: CPT

## 2024-08-01 PROCEDURE — 84134 ASSAY OF PREALBUMIN: CPT

## 2024-08-01 PROCEDURE — 36415 COLL VENOUS BLD VENIPUNCTURE: CPT

## 2024-08-02 ENCOUNTER — HOME CARE VISIT (OUTPATIENT)
Dept: HOME HEALTH SERVICES | Facility: HOME HEALTH | Age: 89
End: 2024-08-02
Payer: MEDICARE

## 2024-08-02 ENCOUNTER — LAB (OUTPATIENT)
Dept: LAB | Facility: LAB | Age: 89
End: 2024-08-02
Payer: COMMERCIAL

## 2024-08-02 DIAGNOSIS — R41.82 ACUTE ON CHRONIC ALTERATION IN MENTAL STATUS: ICD-10-CM

## 2024-08-02 LAB
APPEARANCE UR: ABNORMAL
BACTERIA #/AREA URNS AUTO: ABNORMAL /HPF
BILIRUB UR STRIP.AUTO-MCNC: NEGATIVE MG/DL
COLOR UR: ABNORMAL
GLUCOSE UR STRIP.AUTO-MCNC: NORMAL MG/DL
HOLD SPECIMEN: NORMAL
KETONES UR STRIP.AUTO-MCNC: NEGATIVE MG/DL
LEUKOCYTE ESTERASE UR QL STRIP.AUTO: ABNORMAL
MUCOUS THREADS #/AREA URNS AUTO: ABNORMAL /LPF
NITRITE UR QL STRIP.AUTO: NEGATIVE
PH UR STRIP.AUTO: 5.5 [PH]
PROT UR STRIP.AUTO-MCNC: ABNORMAL MG/DL
RBC # UR STRIP.AUTO: NEGATIVE /UL
RBC #/AREA URNS AUTO: ABNORMAL /HPF
SP GR UR STRIP.AUTO: 1.03
UROBILINOGEN UR STRIP.AUTO-MCNC: NORMAL MG/DL
WBC #/AREA URNS AUTO: ABNORMAL /HPF

## 2024-08-02 PROCEDURE — 81001 URINALYSIS AUTO W/SCOPE: CPT

## 2024-08-02 PROCEDURE — 87086 URINE CULTURE/COLONY COUNT: CPT

## 2024-08-04 LAB
BACTERIA UR CULT: NORMAL
PYRIDOXAL PHOS SERPL-SCNC: 35.4 NMOL/L (ref 20–125)

## 2024-09-26 ENCOUNTER — APPOINTMENT (OUTPATIENT)
Dept: RADIOLOGY | Facility: HOSPITAL | Age: 89
DRG: 116 | End: 2024-09-26
Payer: MEDICARE

## 2024-09-26 ENCOUNTER — TELEPHONE (OUTPATIENT)
Dept: GERIATRIC MEDICINE | Facility: CLINIC | Age: 89
End: 2024-09-26

## 2024-09-26 ENCOUNTER — HOSPITAL ENCOUNTER (INPATIENT)
Facility: HOSPITAL | Age: 89
LOS: 1 days | Discharge: HOME | DRG: 116 | End: 2024-09-27
Attending: STUDENT IN AN ORGANIZED HEALTH CARE EDUCATION/TRAINING PROGRAM | Admitting: OPHTHALMOLOGY
Payer: MEDICARE

## 2024-09-26 ENCOUNTER — ANESTHESIA EVENT (OUTPATIENT)
Dept: OPERATING ROOM | Facility: HOSPITAL | Age: 89
End: 2024-09-26
Payer: MEDICARE

## 2024-09-26 ENCOUNTER — ANESTHESIA (OUTPATIENT)
Dept: OPERATING ROOM | Facility: HOSPITAL | Age: 89
End: 2024-09-26
Payer: MEDICARE

## 2024-09-26 DIAGNOSIS — F03.90 MAJOR NEUROCOGNITIVE DISORDER (MULTI): ICD-10-CM

## 2024-09-26 DIAGNOSIS — M17.12 OSTEOARTHRITIS OF LEFT KNEE, UNSPECIFIED OSTEOARTHRITIS TYPE: ICD-10-CM
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
## 2024-09-26 DIAGNOSIS — S05.32XA RUPTURED GLOBE, LEFT EYE, INITIAL ENCOUNTER: ICD-10-CM

## 2024-09-26 DIAGNOSIS — S05.32XA RUPTURE OF GLOBE OF EYE FOLLOWING BLUNT TRAUMA, LEFT, INITIAL ENCOUNTER: ICD-10-CM
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
## 2024-09-26 PROBLEM — R51.9 INTRACTABLE HEADACHE: Status: RESOLVED | Noted: 2024-02-18 | Resolved: 2024-09-26

## 2024-09-26 PROBLEM — I95.9 HYPOTENSION: Status: RESOLVED | Noted: 2024-01-15 | Resolved: 2024-09-26

## 2024-09-26 PROBLEM — I21.9 MI (MYOCARDIAL INFARCTION) (MULTI): Status: ACTIVE | Noted: 2024-09-26

## 2024-09-26 PROBLEM — R19.5 LOOSE STOOLS: Status: RESOLVED | Noted: 2024-04-21 | Resolved: 2024-09-26

## 2024-09-26 PROBLEM — D72.829 LEUKOCYTOSIS: Status: RESOLVED | Noted: 2024-07-29 | Resolved: 2024-09-26

## 2024-09-26 PROBLEM — M79.605 PAIN IN LEFT LEG: Status: RESOLVED | Noted: 2023-11-06 | Resolved: 2024-09-26

## 2024-09-26 PROBLEM — R32 URINARY INCONTINENCE: Status: RESOLVED | Noted: 2024-04-16 | Resolved: 2024-09-26

## 2024-09-26 PROBLEM — G45.9 TRANSIENT ISCHEMIC ATTACK: Status: ACTIVE | Noted: 2024-09-26

## 2024-09-26 PROBLEM — R60.9 EDEMA: Status: RESOLVED | Noted: 2023-10-05 | Resolved: 2024-09-26

## 2024-09-26 PROBLEM — I63.9 CVA (CEREBRAL VASCULAR ACCIDENT) (MULTI): Status: ACTIVE | Noted: 2024-09-26

## 2024-09-26 PROBLEM — I47.29 NSVT (NONSUSTAINED VENTRICULAR TACHYCARDIA) (MULTI): Status: ACTIVE | Noted: 2020-02-03

## 2024-09-26 PROBLEM — Z86.73 PERSONAL HISTORY OF TRANSIENT ISCHEMIC ATTACK (TIA), AND CEREBRAL INFARCTION WITHOUT RESIDUAL DEFICITS: Status: RESOLVED | Noted: 2019-10-10 | Resolved: 2024-09-26

## 2024-09-26 PROBLEM — G47.00 INSOMNIA: Status: RESOLVED | Noted: 2023-11-06 | Resolved: 2024-09-26

## 2024-09-26 PROBLEM — R63.4 WEIGHT LOSS, UNINTENTIONAL: Status: RESOLVED | Noted: 2023-10-05 | Resolved: 2024-09-26

## 2024-09-26 LAB
ABO GROUP (TYPE) IN BLOOD: NORMAL
ACANTHOCYTES BLD QL SMEAR: ABNORMAL
ALBUMIN SERPL BCP-MCNC: 3.4 G/DL (ref 3.4–5)
ALP SERPL-CCNC: 69 U/L (ref 33–136)
ALT SERPL W P-5'-P-CCNC: 15 U/L (ref 7–45)
ANION GAP SERPL CALC-SCNC: 12 MMOL/L (ref 10–20)
ANTIBODY SCREEN: NORMAL
AST SERPL W P-5'-P-CCNC: 34 U/L (ref 9–39)
BASOPHILS # BLD MANUAL: 0.19 X10*3/UL (ref 0–0.1)
BASOPHILS NFR BLD MANUAL: 1.7 %
BILIRUB SERPL-MCNC: 0.8 MG/DL (ref 0–1.2)
BUN SERPL-MCNC: 18 MG/DL (ref 6–23)
CALCIUM SERPL-MCNC: 8.3 MG/DL (ref 8.6–10.6)
CHLORIDE SERPL-SCNC: 100 MMOL/L (ref 98–107)
CO2 SERPL-SCNC: 29 MMOL/L (ref 21–32)
CREAT SERPL-MCNC: 0.49 MG/DL (ref 0.5–1.05)
DACRYOCYTES BLD QL SMEAR: ABNORMAL
EGFRCR SERPLBLD CKD-EPI 2021: 88 ML/MIN/1.73M*2
EOSINOPHIL # BLD MANUAL: 0 X10*3/UL (ref 0–0.4)
EOSINOPHIL NFR BLD MANUAL: 0 %
ERYTHROCYTE [DISTWIDTH] IN BLOOD BY AUTOMATED COUNT: 22.2 % (ref 11.5–14.5)
ETHANOL SERPL-MCNC: <10 MG/DL
FOLATE SERPL-MCNC: 18.4 NG/ML
GLUCOSE SERPL-MCNC: 76 MG/DL (ref 74–99)
HCT VFR BLD AUTO: 33 % (ref 36–46)
HGB BLD-MCNC: 9.4 G/DL (ref 12–16)
HYPOCHROMIA BLD QL SMEAR: ABNORMAL
IMM GRANULOCYTES # BLD AUTO: 2.24 X10*3/UL (ref 0–0.5)
IMM GRANULOCYTES NFR BLD AUTO: 20.2 % (ref 0–0.9)
INR PPP: 1.3 (ref 0.9–1.1)
LACTATE SERPL-SCNC: 0.6 MMOL/L (ref 0.4–2)
LYMPHOCYTES # BLD MANUAL: 1.48 X10*3/UL (ref 0.8–3)
LYMPHOCYTES NFR BLD MANUAL: 13.3 %
MCH RBC QN AUTO: 23.9 PG (ref 26–34)
MCHC RBC AUTO-ENTMCNC: 28.5 G/DL (ref 32–36)
MCV RBC AUTO: 84 FL (ref 80–100)
METAMYELOCYTES # BLD MANUAL: 0.64 X10*3/UL
METAMYELOCYTES NFR BLD MANUAL: 5.8 %
MONOCYTES # BLD MANUAL: 0.19 X10*3/UL (ref 0.05–0.8)
MONOCYTES NFR BLD MANUAL: 1.7 %
MYELOCYTES # BLD MANUAL: 0.74 X10*3/UL
MYELOCYTES NFR BLD MANUAL: 6.7 %
NEUTROPHILS # BLD MANUAL: 7.77 X10*3/UL (ref 1.6–5.5)
NEUTS BAND # BLD MANUAL: 3.33 X10*3/UL (ref 0–0.5)
NEUTS BAND NFR BLD MANUAL: 30 %
NEUTS SEG # BLD MANUAL: 4.44 X10*3/UL (ref 1.6–5)
NEUTS SEG NFR BLD MANUAL: 40 %
NRBC BLD-RTO: 2.8 /100 WBCS (ref 0–0)
OVALOCYTES BLD QL SMEAR: ABNORMAL
PLATELET # BLD AUTO: 70 X10*3/UL (ref 150–450)
POLYCHROMASIA BLD QL SMEAR: ABNORMAL
POTASSIUM SERPL-SCNC: 4.7 MMOL/L (ref 3.5–5.3)
PROT SERPL-MCNC: 6.5 G/DL (ref 6.4–8.2)
PROTHROMBIN TIME: 14.7 SECONDS (ref 9.8–12.8)
RBC # BLD AUTO: 3.94 X10*6/UL (ref 4–5.2)
RBC MORPH BLD: ABNORMAL
RH FACTOR (ANTIGEN D): NORMAL
SCHISTOCYTES BLD QL SMEAR: ABNORMAL
SODIUM SERPL-SCNC: 136 MMOL/L (ref 136–145)
TOTAL CELLS COUNTED BLD: 120
VARIANT LYMPHS # BLD MANUAL: 0.09 X10*3/UL (ref 0–0.3)
VARIANT LYMPHS NFR BLD: 0.8 %
WBC # BLD AUTO: 11.1 X10*3/UL (ref 4.4–11.3)

## 2024-09-26 PROCEDURE — 71045 X-RAY EXAM CHEST 1 VIEW: CPT

## 2024-09-26 PROCEDURE — 2500000005 HC RX 250 GENERAL PHARMACY W/O HCPCS

## 2024-09-26 PROCEDURE — 2500000002 HC RX 250 W HCPCS SELF ADMINISTERED DRUGS (ALT 637 FOR MEDICARE OP, ALT 636 FOR OP/ED): Mod: SE | Performed by: STUDENT IN AN ORGANIZED HEALTH CARE EDUCATION/TRAINING PROGRAM

## 2024-09-26 PROCEDURE — 85027 COMPLETE CBC AUTOMATED: CPT | Performed by: STUDENT IN AN ORGANIZED HEALTH CARE EDUCATION/TRAINING PROGRAM

## 2024-09-26 PROCEDURE — G0378 HOSPITAL OBSERVATION PER HR: HCPCS

## 2024-09-26 PROCEDURE — 2500000001 HC RX 250 WO HCPCS SELF ADMINISTERED DRUGS (ALT 637 FOR MEDICARE OP): Performed by: EMERGENCY MEDICINE

## 2024-09-26 PROCEDURE — G0390 TRAUMA RESPONS W/HOSP CRITI: HCPCS

## 2024-09-26 PROCEDURE — 82746 ASSAY OF FOLIC ACID SERUM: CPT | Performed by: NURSE PRACTITIONER

## 2024-09-26 PROCEDURE — 7100000001 HC RECOVERY ROOM TIME - INITIAL BASE CHARGE: Performed by: OPHTHALMOLOGY

## 2024-09-26 PROCEDURE — 65285 REPAIR OF EYE WOUND: CPT | Performed by: OPHTHALMOLOGY

## 2024-09-26 PROCEDURE — 86901 BLOOD TYPING SEROLOGIC RH(D): CPT | Performed by: STUDENT IN AN ORGANIZED HEALTH CARE EDUCATION/TRAINING PROGRAM

## 2024-09-26 PROCEDURE — 85610 PROTHROMBIN TIME: CPT | Performed by: STUDENT IN AN ORGANIZED HEALTH CARE EDUCATION/TRAINING PROGRAM

## 2024-09-26 PROCEDURE — 80053 COMPREHEN METABOLIC PANEL: CPT | Performed by: STUDENT IN AN ORGANIZED HEALTH CARE EDUCATION/TRAINING PROGRAM

## 2024-09-26 PROCEDURE — 2500000004 HC RX 250 GENERAL PHARMACY W/ HCPCS (ALT 636 FOR OP/ED): Mod: SE | Performed by: NURSE PRACTITIONER

## 2024-09-26 PROCEDURE — 72128 CT CHEST SPINE W/O DYE: CPT | Mod: RCN | Performed by: RADIOLOGY

## 2024-09-26 PROCEDURE — 71260 CT THORAX DX C+: CPT | Performed by: RADIOLOGY

## 2024-09-26 PROCEDURE — 99223 1ST HOSP IP/OBS HIGH 75: CPT | Performed by: NURSE PRACTITIONER

## 2024-09-26 PROCEDURE — 72170 X-RAY EXAM OF PELVIS: CPT

## 2024-09-26 PROCEDURE — 99285 EMERGENCY DEPT VISIT HI MDM: CPT | Performed by: STUDENT IN AN ORGANIZED HEALTH CARE EDUCATION/TRAINING PROGRAM

## 2024-09-26 PROCEDURE — 08Q9XZZ REPAIR LEFT CORNEA, EXTERNAL APPROACH: ICD-10-PCS | Performed by: OPHTHALMOLOGY

## 2024-09-26 PROCEDURE — 2550000001 HC RX 255 CONTRASTS: Mod: SE | Performed by: STUDENT IN AN ORGANIZED HEALTH CARE EDUCATION/TRAINING PROGRAM

## 2024-09-26 PROCEDURE — 99140 ANES COMP EMERGENCY COND: CPT | Performed by: ANESTHESIOLOGY

## 2024-09-26 PROCEDURE — 74177 CT ABD & PELVIS W/CONTRAST: CPT | Performed by: RADIOLOGY

## 2024-09-26 PROCEDURE — 7100000002 HC RECOVERY ROOM TIME - EACH INCREMENTAL 1 MINUTE: Performed by: OPHTHALMOLOGY

## 2024-09-26 PROCEDURE — 2720000007 HC OR 272 NO HCPCS: Performed by: OPHTHALMOLOGY

## 2024-09-26 PROCEDURE — 3600000007 HC OR TIME - EACH INCREMENTAL 1 MINUTE - PROCEDURE LEVEL TWO: Performed by: OPHTHALMOLOGY

## 2024-09-26 PROCEDURE — 70450 CT HEAD/BRAIN W/O DYE: CPT

## 2024-09-26 PROCEDURE — 72131 CT LUMBAR SPINE W/O DYE: CPT | Mod: RCN

## 2024-09-26 PROCEDURE — 2500000004 HC RX 250 GENERAL PHARMACY W/ HCPCS (ALT 636 FOR OP/ED)

## 2024-09-26 PROCEDURE — 3600000003 HC OR TIME - INITIAL BASE CHARGE - PROCEDURE LEVEL THREE: Performed by: OPHTHALMOLOGY

## 2024-09-26 PROCEDURE — 2500000001 HC RX 250 WO HCPCS SELF ADMINISTERED DRUGS (ALT 637 FOR MEDICARE OP): Mod: SE

## 2024-09-26 PROCEDURE — 71045 X-RAY EXAM CHEST 1 VIEW: CPT | Performed by: RADIOLOGY

## 2024-09-26 PROCEDURE — 2500000001 HC RX 250 WO HCPCS SELF ADMINISTERED DRUGS (ALT 637 FOR MEDICARE OP): Performed by: NURSE PRACTITIONER

## 2024-09-26 PROCEDURE — 70481 CT ORBIT/EAR/FOSSA W/DYE: CPT | Performed by: RADIOLOGY

## 2024-09-26 PROCEDURE — 2500000004 HC RX 250 GENERAL PHARMACY W/ HCPCS (ALT 636 FOR OP/ED): Mod: SE

## 2024-09-26 PROCEDURE — 72170 X-RAY EXAM OF PELVIS: CPT | Performed by: RADIOLOGY

## 2024-09-26 PROCEDURE — 3600000002 HC OR TIME - INITIAL BASE CHARGE - PROCEDURE LEVEL TWO: Performed by: OPHTHALMOLOGY

## 2024-09-26 PROCEDURE — 84075 ASSAY ALKALINE PHOSPHATASE: CPT | Performed by: STUDENT IN AN ORGANIZED HEALTH CARE EDUCATION/TRAINING PROGRAM

## 2024-09-26 PROCEDURE — 70481 CT ORBIT/EAR/FOSSA W/DYE: CPT

## 2024-09-26 PROCEDURE — 3600000008 HC OR TIME - EACH INCREMENTAL 1 MINUTE - PROCEDURE LEVEL THREE: Performed by: OPHTHALMOLOGY

## 2024-09-26 PROCEDURE — 36415 COLL VENOUS BLD VENIPUNCTURE: CPT | Performed by: STUDENT IN AN ORGANIZED HEALTH CARE EDUCATION/TRAINING PROGRAM

## 2024-09-26 PROCEDURE — 85007 BL SMEAR W/DIFF WBC COUNT: CPT | Performed by: STUDENT IN AN ORGANIZED HEALTH CARE EDUCATION/TRAINING PROGRAM

## 2024-09-26 PROCEDURE — 90715 TDAP VACCINE 7 YRS/> IM: CPT | Mod: SE

## 2024-09-26 PROCEDURE — 36415 COLL VENOUS BLD VENIPUNCTURE: CPT | Performed by: NURSE PRACTITIONER

## 2024-09-26 PROCEDURE — 72125 CT NECK SPINE W/O DYE: CPT | Performed by: RADIOLOGY

## 2024-09-26 PROCEDURE — 72125 CT NECK SPINE W/O DYE: CPT

## 2024-09-26 PROCEDURE — 3700000001 HC GENERAL ANESTHESIA TIME - INITIAL BASE CHARGE: Performed by: OPHTHALMOLOGY

## 2024-09-26 PROCEDURE — 83605 ASSAY OF LACTIC ACID: CPT | Performed by: STUDENT IN AN ORGANIZED HEALTH CARE EDUCATION/TRAINING PROGRAM

## 2024-09-26 PROCEDURE — 72131 CT LUMBAR SPINE W/O DYE: CPT | Mod: RCN | Performed by: RADIOLOGY

## 2024-09-26 PROCEDURE — 99285 EMERGENCY DEPT VISIT HI MDM: CPT | Mod: 25

## 2024-09-26 PROCEDURE — 71260 CT THORAX DX C+: CPT

## 2024-09-26 PROCEDURE — 70450 CT HEAD/BRAIN W/O DYE: CPT | Performed by: RADIOLOGY

## 2024-09-26 PROCEDURE — 2500000005 HC RX 250 GENERAL PHARMACY W/O HCPCS: Performed by: OPHTHALMOLOGY

## 2024-09-26 PROCEDURE — 96374 THER/PROPH/DIAG INJ IV PUSH: CPT

## 2024-09-26 PROCEDURE — 99100 ANES PT EXTEME AGE<1 YR&>70: CPT | Performed by: ANESTHESIOLOGY

## 2024-09-26 PROCEDURE — 72128 CT CHEST SPINE W/O DYE: CPT | Mod: RCN

## 2024-09-26 PROCEDURE — A65285 PR REPAIR CORNEA LAC,PERF,RESEC UVEAL: Performed by: ANESTHESIOLOGY

## 2024-09-26 PROCEDURE — 82077 ASSAY SPEC XCP UR&BREATH IA: CPT | Performed by: STUDENT IN AN ORGANIZED HEALTH CARE EDUCATION/TRAINING PROGRAM

## 2024-09-26 PROCEDURE — 2500000004 HC RX 250 GENERAL PHARMACY W/ HCPCS (ALT 636 FOR OP/ED): Mod: SE | Performed by: EMERGENCY MEDICINE

## 2024-09-26 PROCEDURE — 90471 IMMUNIZATION ADMIN: CPT

## 2024-09-26 PROCEDURE — 3700000002 HC GENERAL ANESTHESIA TIME - EACH INCREMENTAL 1 MINUTE: Performed by: OPHTHALMOLOGY

## 2024-09-26 RX ORDER — ESCITALOPRAM OXALATE 10 MG/1
10 TABLET ORAL DAILY
Status: DISCONTINUED | OUTPATIENT
Start: 2024-09-26 | End: 2024-09-27 | Stop reason: HOSPADM

## 2024-09-26 RX ORDER — VANCOMYCIN HYDROCHLORIDE 1 G/20ML
INJECTION, POWDER, LYOPHILIZED, FOR SOLUTION INTRAVENOUS DAILY PRN
Status: DISCONTINUED | OUTPATIENT
Start: 2024-09-26 | End: 2024-09-27 | Stop reason: ALTCHOICE

## 2024-09-26 RX ORDER — PYRIDOXINE HCL (VITAMIN B6) 100 MG
100 TABLET ORAL DAILY
COMMUNITY

## 2024-09-26 RX ORDER — HYDROMORPHONE HYDROCHLORIDE 1 MG/ML
0.1 INJECTION, SOLUTION INTRAMUSCULAR; INTRAVENOUS; SUBCUTANEOUS EVERY 5 MIN PRN
Status: DISCONTINUED | OUTPATIENT
Start: 2024-09-26 | End: 2024-09-26 | Stop reason: HOSPADM

## 2024-09-26 RX ORDER — ACETAMINOPHEN 325 MG/1
975 TABLET ORAL EVERY 8 HOURS
Status: DISCONTINUED | OUTPATIENT
Start: 2024-09-26 | End: 2024-09-27 | Stop reason: HOSPADM

## 2024-09-26 RX ORDER — ESMOLOL HYDROCHLORIDE 10 MG/ML
INJECTION INTRAVENOUS AS NEEDED
Status: DISCONTINUED | OUTPATIENT
Start: 2024-09-26 | End: 2024-09-26

## 2024-09-26 RX ORDER — HYDROMORPHONE HYDROCHLORIDE 1 MG/ML
0.2 INJECTION, SOLUTION INTRAMUSCULAR; INTRAVENOUS; SUBCUTANEOUS EVERY 5 MIN PRN
Status: DISCONTINUED | OUTPATIENT
Start: 2024-09-26 | End: 2024-09-26 | Stop reason: HOSPADM

## 2024-09-26 RX ORDER — FENTANYL CITRATE 50 UG/ML
INJECTION, SOLUTION INTRAMUSCULAR; INTRAVENOUS AS NEEDED
Status: DISCONTINUED | OUTPATIENT
Start: 2024-09-26 | End: 2024-09-26

## 2024-09-26 RX ORDER — POLYETHYLENE GLYCOL 3350 17 G/17G
17 POWDER, FOR SOLUTION ORAL DAILY
Status: DISCONTINUED | OUTPATIENT
Start: 2024-09-27 | End: 2024-09-27 | Stop reason: HOSPADM

## 2024-09-26 RX ORDER — HYDRALAZINE HYDROCHLORIDE 20 MG/ML
5 INJECTION INTRAMUSCULAR; INTRAVENOUS EVERY 30 MIN PRN
Status: DISCONTINUED | OUTPATIENT
Start: 2024-09-26 | End: 2024-09-26 | Stop reason: HOSPADM

## 2024-09-26 RX ORDER — LABETALOL HYDROCHLORIDE 5 MG/ML
5 INJECTION, SOLUTION INTRAVENOUS ONCE AS NEEDED
Status: DISCONTINUED | OUTPATIENT
Start: 2024-09-26 | End: 2024-09-26 | Stop reason: HOSPADM

## 2024-09-26 RX ORDER — PROPOFOL 10 MG/ML
INJECTION, EMULSION INTRAVENOUS AS NEEDED
Status: DISCONTINUED | OUTPATIENT
Start: 2024-09-26 | End: 2024-09-26

## 2024-09-26 RX ORDER — AMOXICILLIN 250 MG
2 CAPSULE ORAL 2 TIMES DAILY
Status: DISCONTINUED | OUTPATIENT
Start: 2024-09-26 | End: 2024-09-27 | Stop reason: HOSPADM

## 2024-09-26 RX ORDER — HYDROMORPHONE HYDROCHLORIDE 1 MG/ML
0.5 INJECTION, SOLUTION INTRAMUSCULAR; INTRAVENOUS; SUBCUTANEOUS EVERY 5 MIN PRN
Status: DISCONTINUED | OUTPATIENT
Start: 2024-09-26 | End: 2024-09-26

## 2024-09-26 RX ORDER — ONDANSETRON HYDROCHLORIDE 2 MG/ML
INJECTION, SOLUTION INTRAVENOUS AS NEEDED
Status: DISCONTINUED | OUTPATIENT
Start: 2024-09-26 | End: 2024-09-26

## 2024-09-26 RX ORDER — WATER 1 ML/ML
IRRIGANT IRRIGATION AS NEEDED
Status: DISCONTINUED | OUTPATIENT
Start: 2024-09-26 | End: 2024-09-26 | Stop reason: HOSPADM

## 2024-09-26 RX ORDER — POVIDONE-IODINE 5 %
SOLUTION, NON-ORAL OPHTHALMIC (EYE) AS NEEDED
Status: DISCONTINUED | OUTPATIENT
Start: 2024-09-26 | End: 2024-09-26 | Stop reason: HOSPADM

## 2024-09-26 RX ORDER — LEVOFLOXACIN 500 MG/1
500 TABLET, FILM COATED ORAL
Status: DISCONTINUED | OUTPATIENT
Start: 2024-09-26 | End: 2024-09-27 | Stop reason: HOSPADM

## 2024-09-26 RX ORDER — OXYCODONE HYDROCHLORIDE 5 MG/1
2.5 TABLET ORAL EVERY 4 HOURS PRN
Status: DISCONTINUED | OUTPATIENT
Start: 2024-09-26 | End: 2024-09-26 | Stop reason: HOSPADM

## 2024-09-26 RX ORDER — SODIUM CHLORIDE, SODIUM LACTATE, POTASSIUM CHLORIDE, CALCIUM CHLORIDE 600; 310; 30; 20 MG/100ML; MG/100ML; MG/100ML; MG/100ML
100 INJECTION, SOLUTION INTRAVENOUS CONTINUOUS
Status: DISCONTINUED | OUTPATIENT
Start: 2024-09-26 | End: 2024-09-26 | Stop reason: HOSPADM

## 2024-09-26 RX ORDER — OXYCODONE HYDROCHLORIDE 5 MG/1
10 TABLET ORAL EVERY 4 HOURS PRN
Status: DISCONTINUED | OUTPATIENT
Start: 2024-09-26 | End: 2024-09-26 | Stop reason: HOSPADM

## 2024-09-26 RX ORDER — OXYCODONE HYDROCHLORIDE 5 MG/1
5 TABLET ORAL EVERY 4 HOURS PRN
Status: DISCONTINUED | OUTPATIENT
Start: 2024-09-26 | End: 2024-09-26 | Stop reason: HOSPADM

## 2024-09-26 RX ORDER — ONDANSETRON HYDROCHLORIDE 2 MG/ML
4 INJECTION, SOLUTION INTRAVENOUS ONCE AS NEEDED
Status: DISCONTINUED | OUTPATIENT
Start: 2024-09-26 | End: 2024-09-26 | Stop reason: HOSPADM

## 2024-09-26 RX ORDER — HYDROMORPHONE HYDROCHLORIDE 1 MG/ML
0.2 INJECTION, SOLUTION INTRAMUSCULAR; INTRAVENOUS; SUBCUTANEOUS EVERY 5 MIN PRN
Status: DISCONTINUED | OUTPATIENT
Start: 2024-09-26 | End: 2024-09-26

## 2024-09-26 RX ORDER — DROPERIDOL 2.5 MG/ML
0.62 INJECTION, SOLUTION INTRAMUSCULAR; INTRAVENOUS ONCE AS NEEDED
Status: DISCONTINUED | OUTPATIENT
Start: 2024-09-26 | End: 2024-09-26 | Stop reason: HOSPADM

## 2024-09-26 RX ORDER — LIDOCAINE HYDROCHLORIDE 10 MG/ML
0.1 INJECTION, SOLUTION EPIDURAL; INFILTRATION; INTRACAUDAL; PERINEURAL ONCE
Status: DISCONTINUED | OUTPATIENT
Start: 2024-09-26 | End: 2024-09-26 | Stop reason: HOSPADM

## 2024-09-26 RX ORDER — LIDOCAINE HYDROCHLORIDE 20 MG/ML
INJECTION, SOLUTION INFILTRATION; PERINEURAL AS NEEDED
Status: DISCONTINUED | OUTPATIENT
Start: 2024-09-26 | End: 2024-09-26

## 2024-09-26 RX ORDER — VANCOMYCIN HYDROCHLORIDE 1 G/200ML
1000 INJECTION, SOLUTION INTRAVENOUS EVERY 12 HOURS
Status: DISCONTINUED | OUTPATIENT
Start: 2024-09-26 | End: 2024-09-26

## 2024-09-26 RX ORDER — QUETIAPINE FUMARATE 25 MG/1
12.5 TABLET, FILM COATED ORAL ONCE AS NEEDED
Status: COMPLETED | OUTPATIENT
Start: 2024-09-26 | End: 2024-09-26

## 2024-09-26 RX ADMIN — IOHEXOL 70 ML: 350 INJECTION, SOLUTION INTRAVENOUS at 07:05

## 2024-09-26 RX ADMIN — VANCOMYCIN HYDROCHLORIDE 1000 MG: 1 INJECTION, SOLUTION INTRAVENOUS at 08:57

## 2024-09-26 RX ADMIN — CEFTAZIDIME 1 G: 1 INJECTION, POWDER, FOR SOLUTION INTRAMUSCULAR; INTRAVENOUS at 12:02

## 2024-09-26 RX ADMIN — ACETAMINOPHEN 975 MG: 325 TABLET ORAL at 22:45

## 2024-09-26 RX ADMIN — SENNOSIDES AND DOCUSATE SODIUM 2 TABLET: 50; 8.6 TABLET ORAL at 22:44

## 2024-09-26 RX ADMIN — QUETIAPINE FUMARATE 12.5 MG: 25 TABLET ORAL at 22:44

## 2024-09-26 RX ADMIN — ESCITALOPRAM OXALATE 10 MG: 10 TABLET ORAL at 12:02

## 2024-09-26 RX ADMIN — CEFTAZIDIME 1 G: 1 INJECTION, POWDER, FOR SOLUTION INTRAMUSCULAR; INTRAVENOUS at 22:56

## 2024-09-26 RX ADMIN — TETANUS TOXOID, REDUCED DIPHTHERIA TOXOID AND ACELLULAR PERTUSSIS VACCINE, ADSORBED 0.5 ML: 5; 2.5; 8; 8; 2.5 SUSPENSION INTRAMUSCULAR at 06:33

## 2024-09-26 RX ADMIN — LEVOFLOXACIN 500 MG: 500 TABLET, FILM COATED ORAL at 10:52

## 2024-09-26 RX ADMIN — ACETAMINOPHEN 975 MG: 325 TABLET ORAL at 14:36

## 2024-09-26 RX ADMIN — IOHEXOL 90 ML: 350 INJECTION, SOLUTION INTRAVENOUS at 07:06

## 2024-09-26 SDOH — SOCIAL STABILITY: SOCIAL INSECURITY: HAVE YOU HAD THOUGHTS OF HARMING ANYONE ELSE?: UNABLE TO ASSESS

## 2024-09-26 SDOH — ECONOMIC STABILITY: FOOD INSECURITY
WITHIN THE PAST 12 MONTHS, THE FOOD YOU BOUGHT JUST DIDN'T LAST AND YOU DIDN'T HAVE MONEY TO GET MORE.: PATIENT UNABLE TO ANSWER

## 2024-09-26 SDOH — SOCIAL STABILITY: SOCIAL INSECURITY: ARE THERE ANY APPARENT SIGNS OF INJURIES/BEHAVIORS THAT COULD BE RELATED TO ABUSE/NEGLECT?: UNABLE TO ASSESS

## 2024-09-26 SDOH — SOCIAL STABILITY: SOCIAL INSECURITY
WITHIN THE LAST YEAR, HAVE YOU BEEN KICKED, HIT, SLAPPED, OR OTHERWISE PHYSICALLY HURT BY YOUR PARTNER OR EX-PARTNER?: PATIENT UNABLE TO ANSWER

## 2024-09-26 SDOH — ECONOMIC STABILITY: FOOD INSECURITY
WITHIN THE PAST 12 MONTHS, YOU WORRIED THAT YOUR FOOD WOULD RUN OUT BEFORE YOU GOT THE MONEY TO BUY MORE.: PATIENT UNABLE TO ANSWER

## 2024-09-26 SDOH — SOCIAL STABILITY: SOCIAL INSECURITY: DO YOU FEEL ANYONE HAS EXPLOITED OR TAKEN ADVANTAGE OF YOU FINANCIALLY OR OF YOUR PERSONAL PROPERTY?: UNABLE TO ASSESS

## 2024-09-26 SDOH — ECONOMIC STABILITY: INCOME INSECURITY
IN THE PAST 12 MONTHS HAS THE ELECTRIC, GAS, OIL, OR WATER COMPANY THREATENED TO SHUT OFF SERVICES IN YOUR HOME?: PATIENT UNABLE TO ANSWER

## 2024-09-26 SDOH — ECONOMIC STABILITY: FOOD INSECURITY
WITHIN THE PAST 12 MONTHS, YOU WORRIED THAT YOUR FOOD WOULD RUN OUT BEFORE YOU GOT MONEY TO BUY MORE.: PATIENT UNABLE TO ANSWER

## 2024-09-26 SDOH — SOCIAL STABILITY: SOCIAL INSECURITY: HAS ANYONE EVER THREATENED TO HURT YOUR FAMILY OR YOUR PETS?: UNABLE TO ASSESS

## 2024-09-26 SDOH — SOCIAL STABILITY: SOCIAL INSECURITY: DO YOU FEEL UNSAFE GOING BACK TO THE PLACE WHERE YOU ARE LIVING?: UNABLE TO ASSESS

## 2024-09-26 SDOH — ECONOMIC STABILITY: INCOME INSECURITY
IN THE PAST 12 MONTHS, HAS THE ELECTRIC, GAS, OIL, OR WATER COMPANY THREATENED TO SHUT OFF SERVICE IN YOUR HOME?: PATIENT UNABLE TO ANSWER

## 2024-09-26 SDOH — SOCIAL STABILITY: SOCIAL INSECURITY
WITHIN THE LAST YEAR, HAVE YOU BEEN HUMILIATED OR EMOTIONALLY ABUSED IN OTHER WAYS BY YOUR PARTNER OR EX-PARTNER?: PATIENT UNABLE TO ANSWER

## 2024-09-26 SDOH — SOCIAL STABILITY: SOCIAL INSECURITY: WITHIN THE LAST YEAR, HAVE YOU BEEN AFRAID OF YOUR PARTNER OR EX-PARTNER?: PATIENT UNABLE TO ANSWER

## 2024-09-26 SDOH — SOCIAL STABILITY: SOCIAL INSECURITY: ABUSE: ADULT

## 2024-09-26 SDOH — SOCIAL STABILITY: SOCIAL INSECURITY
WITHIN THE LAST YEAR, HAVE YOU BEEN RAPED OR FORCED TO HAVE ANY KIND OF SEXUAL ACTIVITY BY YOUR PARTNER OR EX-PARTNER?: PATIENT UNABLE TO ANSWER

## 2024-09-26 SDOH — SOCIAL STABILITY: SOCIAL INSECURITY
WITHIN THE LAST YEAR, HAVE TO BEEN RAPED OR FORCED TO HAVE ANY KIND OF SEXUAL ACTIVITY BY YOUR PARTNER OR EX-PARTNER?: PATIENT UNABLE TO ANSWER

## 2024-09-26 SDOH — SOCIAL STABILITY: SOCIAL INSECURITY: ARE YOU OR HAVE YOU BEEN THREATENED OR ABUSED PHYSICALLY, EMOTIONALLY, OR SEXUALLY BY ANYONE?: UNABLE TO ASSESS

## 2024-09-26 SDOH — HEALTH STABILITY: MENTAL HEALTH: CURRENT SMOKER: 0

## 2024-09-26 SDOH — SOCIAL STABILITY: SOCIAL INSECURITY: WERE YOU ABLE TO COMPLETE ALL THE BEHAVIORAL HEALTH SCREENINGS?: NO

## 2024-09-26 SDOH — SOCIAL STABILITY: SOCIAL INSECURITY: HAVE YOU HAD ANY THOUGHTS OF HARMING ANYONE ELSE?: UNABLE TO ASSESS

## 2024-09-26 SDOH — SOCIAL STABILITY: SOCIAL INSECURITY: DOES ANYONE TRY TO KEEP YOU FROM HAVING/CONTACTING OTHER FRIENDS OR DOING THINGS OUTSIDE YOUR HOME?: UNABLE TO ASSESS

## 2024-09-26 ASSESSMENT — PATIENT HEALTH QUESTIONNAIRE - PHQ9
2. FEELING DOWN, DEPRESSED OR HOPELESS: NOT AT ALL
1. LITTLE INTEREST OR PLEASURE IN DOING THINGS: NOT AT ALL
SUM OF ALL RESPONSES TO PHQ9 QUESTIONS 1 & 2: 0

## 2024-09-26 ASSESSMENT — PAIN - FUNCTIONAL ASSESSMENT
PAIN_FUNCTIONAL_ASSESSMENT: 0-10

## 2024-09-26 ASSESSMENT — LIFESTYLE VARIABLES
HOW OFTEN DO YOU HAVE 6 OR MORE DRINKS ON ONE OCCASION: PATIENT UNABLE TO ANSWER
SKIP TO QUESTIONS 9-10: 0
HOW OFTEN DO YOU HAVE A DRINK CONTAINING ALCOHOL: PATIENT UNABLE TO ANSWER
AUDIT-C TOTAL SCORE: -1
AUDIT-C TOTAL SCORE: -1
HOW MANY STANDARD DRINKS CONTAINING ALCOHOL DO YOU HAVE ON A TYPICAL DAY: PATIENT UNABLE TO ANSWER

## 2024-09-26 ASSESSMENT — PAIN SCALES - GENERAL
PAINLEVEL_OUTOF10: 0 - NO PAIN
PAINLEVEL_OUTOF10: 0 - NO PAIN
PAIN_LEVEL: 1
PAINLEVEL_OUTOF10: 0 - NO PAIN
PAINLEVEL_OUTOF10: 0 - NO PAIN
PAINLEVEL_OUTOF10: 4
PAINLEVEL_OUTOF10: 0 - NO PAIN

## 2024-09-26 ASSESSMENT — COGNITIVE AND FUNCTIONAL STATUS - GENERAL
PATIENT BASELINE BEDBOUND: NO
TURNING FROM BACK TO SIDE WHILE IN FLAT BAD: A LOT
STANDING UP FROM CHAIR USING ARMS: A LOT
DRESSING REGULAR UPPER BODY CLOTHING: A LITTLE
EATING MEALS: A LITTLE
MOVING FROM LYING ON BACK TO SITTING ON SIDE OF FLAT BED WITH BEDRAILS: A LITTLE
MOVING TO AND FROM BED TO CHAIR: A LOT
TOILETING: A LOT
DAILY ACTIVITIY SCORE: 16
CLIMB 3 TO 5 STEPS WITH RAILING: TOTAL
WALKING IN HOSPITAL ROOM: A LOT
HELP NEEDED FOR BATHING: A LOT
PERSONAL GROOMING: A LITTLE
DRESSING REGULAR LOWER BODY CLOTHING: A LITTLE
MOBILITY SCORE: 12

## 2024-09-26 ASSESSMENT — ENCOUNTER SYMPTOMS
CARDIOVASCULAR NEGATIVE: 1
FLANK PAIN: 0
GASTROINTESTINAL NEGATIVE: 1
SORE THROAT: 0
CONSTITUTIONAL NEGATIVE: 1
EYE PAIN: 1
SHORTNESS OF BREATH: 0
DIZZINESS: 0
PALPITATIONS: 0
NEUROLOGICAL NEGATIVE: 1
EYES NEGATIVE: 1
HEADACHES: 0
WOUND: 1
CHILLS: 0
BACK PAIN: 1
FACIAL SWELLING: 1
VOMITING: 0
EYE REDNESS: 1
MUSCULOSKELETAL NEGATIVE: 1
AGITATION: 0
NAUSEA: 0
FEVER: 0
WHEEZING: 0

## 2024-09-26 ASSESSMENT — COLUMBIA-SUICIDE SEVERITY RATING SCALE - C-SSRS
2. HAVE YOU ACTUALLY HAD ANY THOUGHTS OF KILLING YOURSELF?: NO
1. IN THE PAST MONTH, HAVE YOU WISHED YOU WERE DEAD OR WISHED YOU COULD GO TO SLEEP AND NOT WAKE UP?: NO
6. HAVE YOU EVER DONE ANYTHING, STARTED TO DO ANYTHING, OR PREPARED TO DO ANYTHING TO END YOUR LIFE?: NO

## 2024-09-26 ASSESSMENT — ACTIVITIES OF DAILY LIVING (ADL)
LACK_OF_TRANSPORTATION: PATIENT UNABLE TO ANSWER
TOILETING: UNABLE TO ASSESS
GROOMING: UNABLE TO ASSESS
ADEQUATE_TO_COMPLETE_ADL: NO
ASSISTIVE_DEVICE: OTHER (COMMENT)
PATIENT'S MEMORY ADEQUATE TO SAFELY COMPLETE DAILY ACTIVITIES?: NO
WALKS IN HOME: UNABLE TO ASSESS
HEARING - RIGHT EAR: UNABLE TO ASSESS
JUDGMENT_ADEQUATE_SAFELY_COMPLETE_DAILY_ACTIVITIES: NO
FEEDING YOURSELF: UNABLE TO ASSESS
HEARING - LEFT EAR: UNABLE TO ASSESS
BATHING: UNABLE TO ASSESS
DRESSING YOURSELF: UNABLE TO ASSESS

## 2024-09-26 NOTE — PROGRESS NOTES
"Pharmacy Medication History Review    Patrica Mancera is a 89 y.o. female admitted for Fall, initial encounter. Pharmacy reviewed the patient's uvpml-go-vyshhtgyr medications and allergies for accuracy.    Medications ADDED:  Vitamin B-6  Medications CHANGED:  N/A  Medications REMOVED/NO LONGER TAKING:   Aricept      The list below reflects the updated PTA list.   Prior to Admission Medications   Prescriptions Last Dose Informant   donepezil (Aricept) 5 mg tablet Not Taking Child   Sig: Take one tablet by mouth in the evening or at bedtime   Patient not taking: Reported on 9/26/2024   escitalopram (Lexapro) 10 mg tablet 9/25/2024 at Morning Child   Sig: Take 1 tablet (10 mg) by mouth once daily. (STOP taking the 5 mg tablet daily)   pyridoxine (Vitamin B-6) 100 mg tablet 9/25/2024 at Morning Child   Sig: Take 1 tablet (100 mg) by mouth once daily.      Facility-Administered Medications: None        The list below reflects the updated allergy list. Please review each documented allergy for additional clarification and justification.  Allergies  Reviewed by Breanna Phoenix on 9/26/2024        Severity Reactions Comments    Darvocet A500 [propoxyphene N-acetaminophen] Not Specified Unknown     Darvon [propoxyphene] Not Specified Unknown     Propoxyphene-acetaminophen Not Specified Unknown     Valsartan Not Specified Unknown             Patient accepts M2B at discharge.     Sources:   Sources included out patient fill history, OARRS, and patient interview (patient son was a moderate med historian.).    Additional Comments:  Patients son reported that she refuses to take Aricept, she does not like how it makes her feel.       BREANNA RENEE PHOENIX  Pharmacy Technician  09/26/24     Secure Chat preferred   If no response call [phone] or Netechy \"Med Rec\"   "

## 2024-09-26 NOTE — ANESTHESIA PREPROCEDURE EVALUATION
Patient: Patrica Mancera    Procedure Information       Date/Time: 09/26/24 1700    Procedure: Exploration and Repair Ruptured Globe (Left)    Location: [facility] OR 06 / [facility]    Surgeons: Uday Ellis MD            Relevant Problems   Cardiac   (+) Essential hypertension   (+) Left anterior fascicular block      Pulmonary   (+) Chronic obstructive pulmonary disease (Multi)   (+) Unspecified asthma, uncomplicated (HHS-HCC)      Neuro  stumbles   (+) CVA (cerebral vascular accident) (Multi)   (+) ELISHA (generalized anxiety disorder)   (+) Major neurocognitive disorder (Multi)   (+) Transient ischemic attack      GI   (+) Gastroesophageal reflux disease without esophagitis      /Renal (within normal limits)      Liver (within normal limits)      Endocrine (within normal limits)      Hematology   (+) Anemia   (+) Polycythemia vera   (+) Thrombocytopenia (CMS-HCC)      Musculoskeletal   (+) Osteoarthritis of left knee       Clinical information reviewed:   Tobacco  Allergies  Meds  Problems  Med Hx  Surg Hx   Fam Hx  Soc   Hx        NPO Detail:  No data recorded     Physical Exam    Airway  Mallampati: II  TM distance: >3 FB  Neck ROM: full     Cardiovascular   Rhythm: regular  Rate: normal     Dental    Pulmonary - normal exam     Abdominal            Anesthesia Plan    History of general anesthesia?: yes  History of complications of general anesthesia?: no    ASA 3 - emergent     general     The patient is not a current smoker.    Anesthetic plan and risks discussed with healthcare power of .  Use of blood products discussed with healthcare power of  who consented to blood products.    Plan discussed with resident.

## 2024-09-26 NOTE — ANESTHESIA POSTPROCEDURE EVALUATION
Patient: Patrica Mancera    Procedure Summary       Date: 09/26/24 Room / Location: [facility] OR 06 / [facility]    Anesthesia Start: 1730 Anesthesia Stop: 1956    Procedure: Exploration and Repair Ruptured Globe and Corneal laceration repair (Left) Diagnosis:       Ruptured globe, left eye, initial encounter      (Ruptured globe, left eye, initial encounter [S05.32XA])    Surgeons: Uday Ellis MD Responsible Provider: Alex Vuong DO    Anesthesia Type: general ASA Status: 3 - Emergent            Anesthesia Type: general    Vitals Value Taken Time   /61 09/26/24 1951   Temp 36.1 °C (97 °F) 09/26/24 1950   Pulse 69 09/26/24 1955   Resp 14 09/26/24 1955   SpO2 100 % 09/26/24 1955   Vitals shown include unfiled device data.    Anesthesia Post Evaluation    Patient location during evaluation: PACU  Patient participation: complete - patient participated  Level of consciousness: awake and awake and alert  Pain score: 1  Pain management: adequate  Multimodal analgesia pain management approach  Airway patency: patent  Cardiovascular status: acceptable, blood pressure returned to baseline and hemodynamically stable  Respiratory status: acceptable, airway suctioned and nasal cannula  Hydration status: acceptable  Postoperative Nausea and Vomiting: none        No notable events documented.

## 2024-09-26 NOTE — CONSULTS
Reason for consult: Concern for open globe, left eye    History Of Present Illness  Patrica Mancera is a 89 year old female presenting to the ED following a fall out of bed at around 4:15pm. Upon falling, patient did strike her right face (suspect against bedside table), denies LOC. Pt has had difficulty opening her left eye. Patient complaining of left eye pain and back pain, no symptoms OD. Attempted to gather futhter history however patient with history of dementia and is not cooperative with history taking currently. Patient’s son is at bedside and is not able to provide much further detail. States that there was a plastic foreign body OS removed at the OSH.     Past Medical History  She has a past medical history of Personal history of other diseases of the circulatory system, Personal history of other diseases of the respiratory system, Personal history of other diseases of the respiratory system, and Transient cerebral ischemic attack, unspecified.    Family History: reviewed and not pertinent to chief complaint  Medications: please refer to medication reconciliation  Allergies: please refer to patient allergy list    Physical Exam  Base Eye Exam       Visual Acuity    Unable to assess due to severe dementia             Tonometry (Tonopen, 8:43 AM)         Right Left    Pressure 15 deferred              Pupils         Dark Light Shape React APD    Right 3 2 Round Brisk None    Left                   Visual Fields    Unable             Extraocular Movement    Unable             Neuro/Psych    Demented, not oriented              Dilation       Right eye: 1% Mydriacyl & 2.5% Adelso  @ 8:43 AM                  Additional Tests       Color    Unable                 Slit Lamp and Fundus Exam       External Exam         Right Left    External Normal Sunken globe              Slit Lamp Exam         Right Left    Lids/Lashes Normal Water-y red discharge    Conjunctiva/Sclera White and quiet Hemorrhagic chemosis, most  prom rojelio and temp, slightly bullous    Cornea Arcus temporal limbal laceration full thickness    Anterior Chamber Deep and quiet Total hyphema    Iris Round and reactive unable to visualize details    Lens Phakic 1-2+ NS unable to visualize details              Fundus Exam         Right Left    Posterior Vitreous Normal     Disc Normal     C/D Ratio 0.3     Macula Normal     Vessels Normal     Periphery Normal, limited view peripherally due to lack of cooperation                     Imaging  CT head and orbits, without contrast, personally reviewed, shows deformed left globe with irregular contour. No hyperintense foreign body able to be visualized. No retrobulbar hemorrhage or orbital fractures.    Assessment:  89 year old female with hx of dementia, HTN, asthma, GERD, Anemia, HFPEF, thrombocytopenia, TIA, polycythemia vera, COPD, NSVT who is presenting to the ED following a fall out of bed with left eye trauma. Exam is consistent with ruptured globe, left eye.    #Globe rupture, left eye  - Mechanism: Face to bedside table (unwitnessed)  - Concern for foreign body (FB) given outside ED history where foreign body (FB) was removed prior to arrival  - CT showing deflated left globe and irregular contour  - Exam revealing temporal corneal limbal laceration full thickness and total hyphema as well as 360 degree KYLEE  - Start Vancomycin 1g IV q12 hours  - Start Ceftazidime 1g IV q12 hours  - Start Levofloxacin 500 mg PO x 10 days  - Shield over left eye  - NPO. Last PO intake 5AM  - Tetanus PRN per ED  - Consent for urgent surgical exploration and repair obtained by Son at bedside as patient is unable to consent herself due to severe dementia  - Plan for urgent surgical repair today      Thank you for the consult. Note not final until attending signature. Please contact the Ophthalmology service with further questions or concerns.    Wayne Suresh MD  Department of Ophthalmology, PGY-3    Ophthalmology Adult Pager -  [phone]  Ophthalmology Pediatrics Pager - [phone]     For adult follow-up appointments, call: [phone]  For pediatric follow-up appointments, call: [phone]

## 2024-09-26 NOTE — ED PROVIDER NOTES
Emergency Department Provider Note        History of Present Illness     History provided by: Patient and EMS  Limitations to History: Trauma Activation    HPI:  Patrica Mancera is a 89 y.o. female presenting to the ED as a Limited Trauma Activation after a fall out of bed.  Patient arrived to our ED as a trauma transfer from [facility].  that time they were.  Prior to arrival, she had CT imaging performed which was overall unremarkable for intracranial pathology.  On arrival to our ED she has a left eye hyphema with a foreign body which the outside hospital removed prior to patient's arrival to our ED.  She notes significant pain in her left eye, otherwise declining any pain.      Physical Exam   Arrival Vitals:  T 37.2 °C (99 °F)  HR 70  /72  RR 18  O2 (!) 93 % None (Room air)    Primary Survey:  Airway: Intact & patent  Breathing: Equal breath sounds bilaterally  Circulation: 2+ radial and DP pulses bilaterally  Disability: GCS 14.  Gross motor and sensation intact in the bilateral upper and lower extremities.  Exposure: Patient fully exposed, warm blankets applied    Secondary Survey:    Head: Atraumatic. No cephalohematoma.  Eye: Pupils equal, round, and reactive to light. Gaze is conjugate. No orbital ridge bony step-offs, or tenderness.  ENT:   Midface is stable.  No mandibular tenderness or dental malocclusion's.  There is no nasal bone tenderness or deformity.  No epistaxis.  No blood or CSF drainage and external auditory canals.  No intraoral lesions.  Neck: Cervical collar present. There is no C-spine midline tenderness to palpation, step-offs, or deformities.  Trachea is midline.  Chest: Clear to auscultation bilaterally, no chest wall tenderness palpation, crepitus, flail segments noted.  No bruising or abrasions noted to the anterior chest wall.  Cardiovascular: Regular rate, rhythm  Abdomen: Soft, nontender, nondistended.  No bruising or lacerations noted.  Not peritonitic.  Pelvis: Stable  to compression  : Normal external genitalia, no blood at the urethral meatus  Back/spine: No midline T or L-spine tenderness palpation, step-offs, or deformities.  No lacerations, abrasions, or bruising noted.  Extremities: No gross bony deformities, no bony tenderness palpation.  Full range of motion all in 4 extremities.  Skin: No lacerations, bruises, abrasions noted.  Neuro: Alert and oriented to person, place, time.  Face symmetric, speech fluent.  Gross motor and sensory function intact in the bilateral upper and lower extremities.    Medical Decision Making & ED Course   Medical Decision Makin y.o. female presenting to the ED as a Limited Trauma Activation after a fall where she hit the left side of her face, which resulted in a left eye hyphema with foreign body removed prior to arrival..  On arrival to the ED, the patient was immediately brought to the resuscitation bay.  Tetanus was updated, and no other acute findings were noted on her physical exam.  Ophthalmology urgently consulted on arrival to ED.  However given her age and the significance of her left eye hyphema with concerns of foreign body CT pan scans including CT of the orbits were performed.  Initial VBG did not note any acute findings.  Basic labs were also sent and were pending at the time of signout.  I did review the CT of her orbits, which showed concern for left globe rupture.  Discussed with ophthalmology, will follow recs.  Patient was signed out to oncoming provider, pending further workup, however will likely need admission for further management.    ----      EKG Independent Interpretation: EKG not obtained    Independent Result Review and Interpretation: Relevant laboratory and radiographic results were reviewed and independently interpreted by myself.  As necessary, they are commented on in the ED Course.    Social Determinants of Health which Significantly Impact Care: None identified     Chronic conditions affecting the  patient's care: As documented above in [facility]    The patient was discussed with the following consultants/services: Trauma Surgery regarding traumatic hyphema and additional trauma work up    Care Considerations: As documented above in [facility]    ED Course:  ED Course as of 09/28/24 0247   Thu Sep 26, 2024   0639 Attending summary:  89 y/o F presenting as a limited trauma transfer after a fall out of bed. Hx of dementia, Aox2 at baseline. Only known injury at OSH is L hyphema. Primary survey intact with GCS 14 for confusion (likely baseline). HDS. Exam with 100% L eye hyphema and midline T spine tenderness. Bedside CXR without pneumo. Will plan for pan scan, ophtho consult. Likely admit.  [SS]      ED Course User Index  [SS] Lavern Bean MD         Diagnoses as of 09/28/24 0247   Fall, initial encounter   Rupture of globe of eye following blunt trauma, left, initial encounter       Disposition   Patient was signed out to Dr. Marte at 0700 pending completion of their work-up.  Please see the next provider's transition of care note for the remainder of the patient's care.     Procedures   Procedures    Patient seen and discussed with ED attending physician.    Parminder Brito DO  Emergency Medicine       Parminder Brito DO  Resident  09/28/24 0247

## 2024-09-26 NOTE — H&P
[facility]  TRAUMA SERVICE - HISTORY AND PHYSICAL     Patient Name: Patrica Mancera  MRN: [id number]  Admit Date: 926  : 9/10/1931  AGE: 89 y.o.   GENDER: female  ==============================================================================  MECHANISM OF INJURY / CHIEF COMPLAINT:   89 year old female with hx dementia presents as limited trauma activation from EMS ground from [facility] s/p roll out of bed around 4:30 this morning, +face strike without LOC, no reported blood thinners. Complains of left eye pain with vision discrepancy, hyphema diagnosed at [facility] facility. Foreign body removed from eye as well. En route, VSS and at baseline mentation, vitals stable. Full code per son.    Injuries/problems:  - L globe rupture  - Hx dementia (A&Ox2), TIA, HTN, GERD, asthma    INCIDENTAL FINDINGS:  Stable mediastinal adenopathy, splenomegaly, increased size splenic lesion, thyroid nodules, ascites, rectal thickening, bone demineralization    ==============================================================================  PLAN OF CARE:    - No further trauma surgery needs or imaging studies  - Ophthalmology consulted and to take to OR for intervention  - Trauma will sign off, no follow up needed    Discussed with Dr. Estrada and Dr. Aamir Davis PA-C      ==============================================================================  PAST MEDICAL HISTORY:   PMH:   Past Medical History:   Diagnosis Date    Personal history of other diseases of the circulatory system     History of hypertension    Personal history of other diseases of the respiratory system     History of asthma    Personal history of other diseases of the respiratory system     History of chronic obstructive lung disease    Transient cerebral ischemic attack, unspecified     TIA (transient ischemic attack)     Last menstrual period: n/a    PSH:   Past Surgical History:   Procedure Laterality Date    MR  HEAD ANGIO WO IV CONTRAST  3/7/2015    [place] HEAD ANGIO [place] [facility] [place]    MR NECK ANGIO WO IV CONTRAST  3/7/2015    MR NECK ANGIO WO IV CONTRAST [place] [facility] [place]    OTHER SURGICAL HISTORY  11/05/2019    Cataract surgery    OTHER SURGICAL HISTORY  11/05/2019    Hysterectomy     FH:   Family History   Problem Relation Name Age of Onset    No Known Problems Mother      No Known Problems Father       SOCIAL HISTORY:    Smoking:    Social History     Tobacco Use   Smoking Status Never   Smokeless Tobacco Never       Alcohol:    Social History     Substance and Sexual Activity   Alcohol Use Never       Drug use: denies    MEDICATIONS: pending review  Prior to Admission medications    Medication Sig Start Date End Date Taking? Authorizing Provider   albuterol 2.5 mg /3 mL (0.083 %) nebulizer solution Inhale 3 mL every 6 hours if needed.    Historical Provider, MD   carboxymethylcellulose PF (Artificial Tears, cmc,) 1 % ophthalmic solution Administer into affected eye(s).    Historical Provider, MD   donepezil (Aricept) 5 mg tablet Take one tablet by mouth in the evening or at bedtime 7/29/24   MALIHA Metzger   escitalopram (Lexapro) 10 mg tablet Take 1 tablet (10 mg) by mouth once daily. (STOP taking the 5 mg tablet daily) 7/29/24 7/29/25  MALIHA Metzger   melatonin 3 mg tablet Take 1 tablet (3 mg) by mouth as needed at bedtime for sleep.    Historical Provider, MD   omeprazole (Acid Reducer, omeprazole,) 20 mg capsule,delayed release(DR/EC) Take 1 capsule (20 mg) by mouth once daily in the morning. Take before meals. 11/6/23   MALIHA Metzger   pyridoxine (Vitamin B-6) 100 mg tablet Take 1 tablet (100 mg) by mouth once daily. 11/21/23 11/20/24  MALIHA Metzger     ALLERGIES:   Allergies   Allergen Reactions    Darvocet A500 [Propoxyphene N-Acetaminophen] Unknown    Darvon [Propoxyphene] Unknown    Propoxyphene-Acetaminophen Unknown    Valsartan Unknown        REVIEW OF SYSTEMS:  Review of Systems   Constitutional:  Negative for chills and fever.   HENT:  Negative for ear pain and sore throat.    Eyes:  Positive for pain, redness and visual disturbance.   Respiratory:  Negative for shortness of breath and wheezing.    Cardiovascular:  Negative for chest pain and palpitations.   Gastrointestinal:  Negative for nausea and vomiting.   Genitourinary:  Negative for flank pain and pelvic pain.   Musculoskeletal:  Positive for back pain.   Skin:  Positive for wound. Negative for rash.   Neurological:  Negative for dizziness and headaches.   Psychiatric/Behavioral:  Negative for agitation and behavioral problems.      PHYSICAL EXAM:  PRIMARY SURVEY:  Primary Survey  A: Airway intact  B: Breathing spontaneously, breath sounds are bilateral and equal  C: R pupil 3->2. L pupil non-reactive with blood comprising most of pupillary space. GCS 14 baseline (E4, V4, M6). Moving all 4 extremities  E: Patient exposed. Warm blankets placed on patient   SECONDARY SURVEY/PHYSICAL EXAM:  Physical Exam  Secondary Survey:  NEURO: GCS 14 (-1 verbal). CHRISTENSEN equally, muscle strength 5/5 with hand  and 4/5 with pf/df bilat, no sensory deficits  HEAD: NC/AT, No lacerations or abrasions, no bony step offs, midface stable.  EENT: see primary. external ear without laceration. Nasal septum midline, no crepitus or septal hematoma. Oral mucosa and tongue without lacerations, teeth in place and multiple chronically missing  NECK: No cervical spine tenderness or step offs, no lacerations or abrasions, trachea midline. No JVD. Field collar in place  RESPIRATORY/CHEST: No abrasions, contusions, crepitus or tenderness to palpation. Non-labored, equal chest expansion, CTAB, no W/R/R.  CV: Rate controlled rhythm. Pulses bilateral: 2+ radial, 2+DP, 2+PT. No TTP of chest  ABDOMEN: soft, nontender, nondistended. No  abrasions or lacerations. +c-sect scar  PELVIS: Stable to compression.  : nml external  "genitalia, no blood at urethral meatus. Currently voiding yellow urine in diaper  BACK/SPINE: +mid thoracic midline tenderness with ecchymosis just left lateral of midline, no step offs. Kyphotic/scoliotic. No lumbar midline tenderness, step-offs, or deformities.  No abrasions, hematomas or lacerations noted.  EXTREMITIES: No edema or cyanosis. Nml ROM w/o pain. No acute deformities, lacerations or contusions.   IMAGING SUMMARY:  (summary of findings, not a copy of dictation)  Cxr/pxr, pan scan including CT orbit with above findings    LABS:                No lab exists for component: \"LABALBU\"            I have reviewed all laboratory and imaging results ordered/pertinent for this encounter.       "

## 2024-09-26 NOTE — PROGRESS NOTES
Limited: Fall    Pt is a 89 year old female presenting to the ED following a fall. Per report, pt fell out of bed at around 4:15pm. Pt did strike her head, -LOC. Pt has been unable to open her left eye. A foreign object was removed from pt's eye at the OSH. Pt presenting to the ED with a left eye injury and back pain. Pt's son Ernie is in route to the ED. Pt is slightly confused at baseline.    Plan: pending    KWASI Tyler     Secure Chat  [phone]

## 2024-09-26 NOTE — H&P
"History Of Present Illness  Patrica Mancera is a 89 y.o. female presenting with possible open globe in left eye here for globe exploration and repair.     Past Medical History  Past Medical History:   Diagnosis Date    Asthma (HHS-HCC)     COPD (chronic obstructive pulmonary disease) (Multi)     Hypertension     Transient cerebral ischemic attack, unspecified     TIA (transient ischemic attack)       Surgical History  Past Surgical History:   Procedure Laterality Date    CHOLECYSTECTOMY      EYE SURGERY      cataract    HYSTERECTOMY      MR HEAD ANGIO WO IV CONTRAST  03/07/2015    MR HEAD ANGIO WO IV CONTRAST 3/7/2015 [facility] [place] [facility]    MR NECK ANGIO WO IV CONTRAST  03/07/2015    MR NECK ANGIO WO IV CONTRAST 3/7/2015 [facility] [place] [facility]        Social History  She reports that she has never smoked. She has never used smokeless tobacco. She reports that she does not drink alcohol and does not use drugs.    Family History  Family History   Problem Relation Name Age of Onset    No Known Problems Mother      Hypertension Father          Allergies  Darvocet a500 [propoxyphene n-acetaminophen], Darvon [propoxyphene], Propoxyphene-acetaminophen, and Valsartan    Review of Systems   All other systems reviewed and are negative.       Physical Exam  Cardiovascular:      Rate and Rhythm: Normal rate.   Pulmonary:      Effort: Pulmonary effort is normal.   Musculoskeletal:      Cervical back: Neck supple.   Neurological:      Mental Status: She is alert.          Last Recorded Vitals  Blood pressure 132/68, pulse 69, temperature 36.9 °C (98.4 °F), temperature source Temporal, resp. rate 20, height 1.575 m (5' 2\"), weight (!) 34.4 kg (75 lb 13.4 oz), SpO2 94%.    Relevant Results             Assessment/Plan   Assessment & Plan  Fall, initial encounter    Ruptured globe, left eye, initial encounter      89 y.o. female presenting with possible open globe in left eye here for globe exploration and repair.           Keith MORENO " Venkatesh Almendarez MD

## 2024-09-26 NOTE — PROGRESS NOTES
I assumed care of this patient at 7 AM.    Please see initial provider note for full HPI.  Briefly this is a 89-year-old female presenting as a trauma consult due to a fall.  She has a traumatic left globe rupture.  Rest of CT pan scan was negative and trauma surgery signed off.  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  Vitals and lab work otherwise is stable.  Patient was admitted to medicine and went emergently to the OR with ophthalmology.  Patient was covered empirically with antibiotics and had tetanus updated.  Patient care was overseen by attending physician agrees with the plan and disposition.  Son was at bedside to provide consent given patient's history of dementia.    Kay Marte MD  Emergency Medicine - PGY3  [facility]  [id number] [address] [facility] [id number]

## 2024-09-26 NOTE — ANESTHESIA PROCEDURE NOTES
Airway  Date/Time: 9/26/2024 5:40 PM  Urgency: elective    Airway not difficult    Staffing  Performed: resident   Authorized by: Romeo Cabrera MD    Performed by: Bethel Montero MD  Patient location during procedure: OR    Indications and Patient Condition  Indications for airway management: anesthesia and airway protection  Spontaneous ventilation: present  Sedation level: deep  Preoxygenated: yes  Patient position: sniffing  Mask difficulty assessment: 0 - not attempted  Planned trial extubation    Final Airway Details  Final airway type: supraglottic airway      Successful airway: Supreme  Size 3     Number of attempts at approach: 1

## 2024-09-26 NOTE — ED TRIAGE NOTES
Patient to ED from [facility] as a Limited trauma. Patient fall out of bed, hit left face - hyphema lt eye (fb removed? circular lense looking). Hx of dementia (A&Ox3 disoriented to time) currently A+O2

## 2024-09-26 NOTE — H&P
HPI     Ms Mancera is a 89-year-old female with PMHx: HFpEF, COPD, HTN, dementia who presented to the ED today after falling out of bed.  On at bedside and acts as historian along with patient.  Per son patient rolled out of bed and hit her chair.  States she fell on her head denies any LOC but does complain of left eye pain swelling.    While in the ED her vitals were stable and WNL labs were also WNL for patient.  Hgb 9.4 and platelets 70 which is baseline.  Patient was pan scanned and found to have a left globe rupture and ophthalmology was consulted in the ED.  Patient to be admitted for fall.    ROS/EXAM     Review of Systems   Constitutional: Negative.    HENT:  Positive for facial swelling.    Eyes: Negative.    Cardiovascular: Negative.    Gastrointestinal: Negative.    Genitourinary: Negative.    Musculoskeletal: Negative.    Skin: Negative.    Neurological: Negative.      Physical Exam  Vitals reviewed.   Constitutional:       Appearance: Normal appearance.   HENT:      Head: Normocephalic.      Mouth/Throat:      Mouth: Mucous membranes are dry.   Eyes:      Extraocular Movements: Extraocular movements intact.      Conjunctiva/sclera: Conjunctivae normal.      Pupils: Pupils are equal, round, and reactive to light.   Cardiovascular:      Rate and Rhythm: Normal rate and regular rhythm.      Pulses: Normal pulses.      Heart sounds: Normal heart sounds, S1 normal and S2 normal.   Pulmonary:      Effort: Pulmonary effort is normal.      Breath sounds: Normal breath sounds and air entry.   Abdominal:      General: Abdomen is flat. Bowel sounds are normal.      Palpations: Abdomen is soft.   Genitourinary:     Comments: mauricio  Musculoskeletal:         General: Normal range of motion.      Cervical back: Full passive range of motion without pain and normal range of motion.   Skin:     General: Skin is warm and dry.   Neurological:      General: No focal deficit present.      Mental Status: She is alert.  Mental status is at baseline.      Comments: Oriented to self and month and can tell me her birthday.  Son at bedside states this is her baseline.  Not normally able to tell you the year or who the president is.   Psychiatric:         Attention and Perception: Attention normal.         Mood and Affect: Mood normal.         Speech: Speech normal.         Histories     Patient Active Problem List    Diagnosis Date Noted    Fall, initial encounter 09/26/2024    Transient ischemic attack 09/26/2024    Vitamin B6 deficiency 07/29/2024    Anemia 07/29/2024    Heart failure with preserved ejection fraction (Multi) 07/29/2024    Thrombocytopenia (CMS-HCC) 07/29/2024    Left anterior fascicular block 07/29/2024    Acute on chronic alteration in mental status 07/29/2024    Incontinence without sensory awareness 04/16/2024    Hallucinations 02/18/2024    Major neurocognitive disorder (Multi) 02/18/2024    ELISHA (generalized anxiety disorder) 01/15/2024    Advanced care planning/counseling discussion 01/15/2024    Gastroesophageal reflux disease without esophagitis 11/06/2023    Osteoarthritis of left knee 11/06/2023    Protein-calorie malnutrition, unspecified severity (Multi) 10/05/2023    SDH (subdural hematoma) (Multi) 10/05/2023    NSVT (nonsustained ventricular tachycardia) (Multi) 02/03/2020    Essential hypertension 10/10/2019    Unspecified asthma, uncomplicated ([facility]) 10/10/2019    Chronic obstructive pulmonary disease (Multi) 08/15/2016    Polycythemia vera (Multi) 03/04/2009    Obstructive chronic bronchitis without exacerbation (Multi) 03/04/2009     Past Medical History:   Diagnosis Date    Asthma ([facility])     COPD (chronic obstructive pulmonary disease) (Multi)     Hypertension     Transient cerebral ischemic attack, unspecified     TIA (transient ischemic attack)      Past Surgical History:   Procedure Laterality Date    CHOLECYSTECTOMY      EYE SURGERY      cataract    HYSTERECTOMY      MR HEAD ANGIO WO IV  "CONTRAST  03/07/2015    MR HEAD ANGIO WO IV CONTRAST 3/7/2015 [facility]    MR NECK ANGIO WO IV CONTRAST  03/07/2015    MR NECK ANGIO WO IV CONTRAST 3/7/2015 [facility]      Allergies   Allergen Reactions    Darvocet A500 [Propoxyphene N-Acetaminophen] Unknown    Darvon [Propoxyphene] Unknown    Propoxyphene-Acetaminophen Unknown    Valsartan Unknown      Social History     Tobacco Use    Smoking status: Never    Smokeless tobacco: Never   Substance Use Topics    Alcohol use: Never      Family History   Problem Relation Name Age of Onset    No Known Problems Mother      Hypertension Father       Vitals/LABS/RESULTS     Last Recorded Vitals  Blood pressure 129/80, pulse 75, temperature 37.2 °C (99 °F), resp. rate 13, height 1.575 m (5' 2\"), weight (!) 34.4 kg (75 lb 13.4 oz), SpO2 94%.  Intake/Output last 3 Shifts:  No intake/output data recorded.    Relevant Results  Lab Results   Component Value Date    WBC 11.1 09/26/2024    HGB 9.4 (L) 09/26/2024    HCT 33.0 (L) 09/26/2024    MCV 84 09/26/2024    PLT 70 (L) 09/26/2024      Lab Results   Component Value Date    GLUCOSE 76 09/26/2024    CALCIUM 8.3 (L) 09/26/2024     09/26/2024    K 4.7 09/26/2024    CO2 29 09/26/2024     09/26/2024    BUN 18 09/26/2024    CREATININE 0.49 (L) 09/26/2024     XR pelvis 1-2 views    Result Date: 9/26/2024  Interpreted By:  Srinivasa Michele and Kamau Nyokabi STUDY: XR PELVIS 1-2 VIEWS; ;  9/26/2024 6:40 am   INDICATION: Signs/Symptoms:fall.     COMPARISON: None.   ACCESSION NUMBER(S): [id number]   ORDERING CLINICIAN: JOSE MOSHER   FINDINGS: Single AP view of the pelvis.   No acute fracture or malalignment of the pelvis.   Degenerative changes seen of the spine and hips. There is diffuse lace-like increased density to the osseous structures.   Soft tissues are unremarkable.       1. No acute fracture or malalignment of the pelvis. 2. Diffuse lace-like increased density to the osseous structures. " Some differential considerations include sequelae of myelofibrosis and renal osteodystrophy. Other etiologies are not excluded. Clinical and laboratory correlation is recommended.     I personally reviewed the images/study and I agree with Dr. Paxton Schafer findings as stated. This study was interpreted at [place], [place]   MACRO: None   Signed by: Srinivasa Michele 9/26/2024 9:15 AM Dictation workstation:   [id number]    XR chest 1 view    Result Date: 9/26/2024  Interpreted By:  Srinivasa Michele and Kamau Nyokabi STUDY: XR CHEST 1 VIEW;  9/26/2024 6:40 am   INDICATION: Signs/Symptoms:Trauma.     COMPARISON: Chest x-ray 06/17/2023, CT chest abdomen pelvis 11/21/2022   ACCESSION NUMBER(S): [id number]   ORDERING CLINICIAN: JOSE MOSHER   FINDINGS:   PA and lateral radiographs of the chest were provided.     CARDIOMEDIASTINAL SILHOUETTE: Cardiomediastinal silhouette is enlarged.   LUNGS: There is diffuse prominence of the pulmonary interstitium and matthew.   ABDOMEN: No remarkable upper abdominal findings.   BONES: No acute osseous changes.       Findings suggestive of a degree of pulmonary edema/CHF. I personally reviewed the images/study and I agree with Dr. Paxton Schafer findings as stated. This study was interpreted at [place], [place]   MACRO: None   Signed by: Srinivasa Michele 9/26/2024 9:14 AM Dictation workstation:   [id number]    CT chest abdomen pelvis w IV contrast    Result Date: 9/26/2024  Interpreted By:  Jeffrey Nagy and Ebai Jerky STUDY: CT CHEST ABDOMEN PELVIS W IV CONTRAST;  9/26/2024 7:09 am   INDICATION: Signs/Symptoms:Trauma.       Per EMR: 89 year old female with hx dementia presents as limited trauma activation from EMS ground from [facility] s/p roll out of bed around 4:30 this morning   COMPARISON: CT chest abdomen pelvis 11/21/2022.   ACCESSION NUMBER(S): [id number]   ORDERING  CLINICIAN: JOSE MOSHER   TECHNIQUE: Contiguous axial images of the chest, abdomen, and pelvis were obtained after the intravenous administration of iodinated contrast. Coronal and sagittal reformatted images were reconstructed from the axial data.   FINDINGS: CT CHEST:   MEDIASTINUM AND LYMPH NODES: Stable enlarged 3.0 cm hypodense lesion within the right thyroid gland containing a central nodular focus of hyperdensity measuring 1.4 cm. Additional stable hypodense foci along the inferior aspect of the left thyroid gland. There is a stable small hiatal hernia. The esophageal wall appears within normal limits.  Stable numerous prominent/slightly enlarged left axillary lymph node measuring up to 9 mm (series 201, image 18). Additionally, there is a stable 1.7 cm left lower paratracheal lymph node. No new or enlarging mediastinal or axillary lymphadenopathy. No pneumomediastinum.   VESSELS:  Normal caliber thoracic aorta. Mild aortic atherosclerosis. Stable enlargement of the main pulmonary artery measuring up to 3.2 cm in transverse diameter.   HEART: Stable cardiomegaly.  No coronary artery calcifications. No significant pericardial effusion.   LUNG, AIRWAYS, PLEURA: The trachea and mainstem bronchus patent. There is no endobronchial lesion. There is stable peribronchial thickening and bronchiectasis/bronchiolectasis within the left lower lobe. There is interval increase in adjacent airspace opacity with volume loss, likely relating to subsegmental atelectasis. Bandlike opacity within the lingula and right lower lobe, likely relates to atelectasis. There is no pneumothorax. Diffuse smooth interlobular septal thickening in the background of ground-glass opacities throughout bilateral lungs.   CHEST WALL SOFT TISSUES: No discernible acute abnormality. There is diffuse body wall edema.   OSSEOUS STRUCTURES: No acute osseous abnormality.     CT ABDOMEN/PELVIS:   ABDOMINAL WALL: There is moderate to severe diffuse  body wall edema.   LIVER: Stable hepatomegaly measuring up to 19.1 cm in craniocaudal dimension. The liver is normal enhancement. No focal liver lesions.   BILE DUCTS: No significant intrahepatic or extrahepatic dilatation.   GALLBLADDER: Cholelithiasis without evidence of cholecystitis.   PANCREAS: No significant abnormality.   SPLEEN: Stable splenomegaly measuring up to 19.1 cm in craniocaudal dimension. There has been interval increase in size of a hypoenhancing lesion within the inferior aspect of the liver measuring 6.0 x 5.3 cm (series 301, image 53). There are several stable low-density cystic renal lesions measuring up to 1.7 cm (series 301, image 24). Additionally, there are few scattered low-density liver lesions which appear slightly more pronounced when compared to the prior examination. The are few enhancing lesions better appreciated on the arterial phase, the largest measures 3.1 x 2.6 cm (series 201, image 72)   ADRENALS: No significant abnormality.   KIDNEYS, URETERS, BLADDER: Kidneys are normal in size and enhancement. The left kidney is inferiorly displaced due to the above-mentioned splenomegaly. Multiple stable low-density renal lesions, incompletely characterized however likely represent cysts. Stable bilateral renal simple cysts measuring up to 3.1 cm on the left. Renal excreted contrast within the bilateral renal pelvis limits evaluation of calcified stones, allowing for this there is no evidence of nephrolithiasis. No hydroureteronephrosis. Bladder is unremarkable for degree of distention. Renal excreted contrast is noted layering within the bladder lumen.   REPRODUCTIVE ORGANS: Surgically absent. No adnexal masses.   VESSELS: Severe atherosclerotic calcification of the abdominal aorta and its branching vessels without aneurysmal dilatation. The IVC is normal in caliber. The main portal vein is enlarged measuring up to 1.9 cm, similar to prior. There are multiple perisplenic collateral  vessels, similar to prior.   RETROPERITONEUM/LYMPH NODES: No enlarged lymph nodes. No acute retroperitoneal abnormality.   BOWEL/MESENTERY/PERITONEUM: The stomach is unremarkable for degree of distention. There is a stable small hiatal hernia. The stomach is displaced to the right due to the enlarged splenomegaly. There is no evidence of obstruction. The small and large bowel are normal in caliber and demonstrate no wall thickening. Colonic diverticulosis without evidence of diverticulitis. Equivocal circumferential mural thickening of the rectum (series 301, image 122), new from prior. The appendix is definitely not visualized, however there is no pericecal fat stranding or fluid to suggest acute appendicitis.   Diffuse mesenteric haziness, likely relates to edema. Trace pelvic free fluid.     MUSCULOSKELETAL: No acute osseous abnormality. Diffuse osseous demineralization. Similar appearance of multiple subtle sclerotic foci throughout the pelvis which may relate to osteopenia however an underlying lytic process is not entirely excluded.       1. No acute traumatic findings in the chest abdomen or pelvis. 2. Diffuse smooth interlobular septal thickening and ground-glass opacities throughout bilateral lungs, likely relates to interstitial and alveolar edema. Alternatively, this could be seen the setting of an atypical infection or pulmonary hemorrhage, correlation is recommended. 3. 4. Stable mediastinal adenopathy and splenomegaly. Interval increase in size of a 6.0 cm hypoenhancing splenic lesion as well as increased prominence of multiple smaller subcentimeter hypodense lesions. Findings are concerning for an underlying malignancy such as lymphoma/leukemia. Correlation with laboratory values and PET-CT is recommended. 5. Heterogeneous enlargement of the thyroid gland with multiple nodules, recommend correlation with thyroid ultrasound if not previously obtained. 6. Findings compatible with fluid overload with  diffuse body wall edema and small abdominal ascites. 7. Equivocal circumferential mural thickening of the rectum, recommend correlation with concern for proctitis. 8. Additional stable chronic findings as described.   I personally reviewed the images/study and I agree with the findings as stated by Resident Lamonte Lopes. This study was interpreted at [place], [place].   MACRO: None.   Signed by: Jeffrey Nagy 9/26/2024 7:53 AM Dictation workstation:   [id number]    CT thoracic spine wo IV contrast    Result Date: 9/26/2024  Interpreted By:  Jose Antonio Murray, STUDY: CT THORACIC SPINE WO IV CONTRAST; CT LUMBAR SPINE WO IV CONTRAST; 9/26/2024 7:09 am   INDICATION: Signs/Symptoms:fall.     COMPARISON: CT PA dated 11/21/2022. CT abdomen and pelvis dated 11/21/2022.   ACCESSION NUMBER(S): [id number]; [id number]   ORDERING CLINICIAN: JOSE MOSHER   TECHNIQUE: Axial CT images of the thoracic and lumbar spine are obtained. Axial, coronal and sagittal reconstructions are submitted for review.   FINDINGS: THORACIC SPINE:   Diffusely decreased bone mineralization. No acute fracture. There is similar appearance of remote osteoporotic T6 compression fracture with anterior wedging and estimated 60% vertebral body height loss with focal kyphosis at the T6 level. There is trace osseous retropulsion of the inferior endplate of T6 (1-2 mm).   Similar appearance of presumed large vertebral body hemangiomas within T10 and T12. Thoracic vertebral body heights are otherwise maintained. Mild dextroscoliotic curvature of the thoracic spine. Intervertebral disc spaces are grossly preserved. No CT apparent high-grade spinal canal stenosis.   Thoracic findings detailed separately.     LUMBAR SPINE:   Normal segmentation. Diffusely decreased bone mineralization. No acute fracture identified.   Vertebral body heights are grossly maintained. Large vertebral body hemangiomas are again suggested  within the L3 and L4 vertebrae. Alignment is within normal limits. Mild-to-moderate lower lumbar facet arthropathy. Mild degenerative disc height loss at L4-L5. Remaining intervertebral disc spaces are grossly maintained. Disc bulge and ligamentum flavum hypertrophy at L4-L5 contributes to mild-to-moderate spinal canal stenosis with mild bilateral neural foraminal narrowing suggested   Abdomen and pelvis findings reported separately.       Diffusely decreased bone mineralization with no definite acute osseous abnormality of the thoracic and lumbar spine.   Similar appearance of remote T6 osteoporotic appearing compression fracture with estimated 60% vertebral height loss, trace osseous retropulsion of the inferior endplate, and focal kyphosis. Scoliosis appears worsened from prior CT PA examination.   Similar appearance of presumed multiple large vertebral body hemangiomas.   MACRO: None   Signed by: Jose Antonio Murray 9/26/2024 7:47 AM Dictation workstation:   [id number]    CT lumbar spine wo IV contrast    Result Date: 9/26/2024  Interpreted By:  Jose Antonio Murray, STUDY: CT THORACIC SPINE WO IV CONTRAST; CT LUMBAR SPINE WO IV CONTRAST; 9/26/2024 7:09 am   INDICATION: Signs/Symptoms:fall.     COMPARISON: CT PA dated 11/21/2022. CT abdomen and pelvis dated 11/21/2022.   ACCESSION NUMBER(S): [id number]; [id number]   ORDERING CLINICIAN: JOSE MOSHER   TECHNIQUE: Axial CT images of the thoracic and lumbar spine are obtained. Axial, coronal and sagittal reconstructions are submitted for review.   FINDINGS: THORACIC SPINE:   Diffusely decreased bone mineralization. No acute fracture. There is similar appearance of remote osteoporotic T6 compression fracture with anterior wedging and estimated 60% vertebral body height loss with focal kyphosis at the T6 level. There is trace osseous retropulsion of the inferior endplate of T6 (1-2 mm).   Similar appearance of presumed large vertebral body hemangiomas within T10 and  T12. Thoracic vertebral body heights are otherwise maintained. Mild dextroscoliotic curvature of the thoracic spine. Intervertebral disc spaces are grossly preserved. No CT apparent high-grade spinal canal stenosis.   Thoracic findings detailed separately.     LUMBAR SPINE:   Normal segmentation. Diffusely decreased bone mineralization. No acute fracture identified.   Vertebral body heights are grossly maintained. Large vertebral body hemangiomas are again suggested within the L3 and L4 vertebrae. Alignment is within normal limits. Mild-to-moderate lower lumbar facet arthropathy. Mild degenerative disc height loss at L4-L5. Remaining intervertebral disc spaces are grossly maintained. Disc bulge and ligamentum flavum hypertrophy at L4-L5 contributes to mild-to-moderate spinal canal stenosis with mild bilateral neural foraminal narrowing suggested   Abdomen and pelvis findings reported separately.       Diffusely decreased bone mineralization with no definite acute osseous abnormality of the thoracic and lumbar spine.   Similar appearance of remote T6 osteoporotic appearing compression fracture with estimated 60% vertebral height loss, trace osseous retropulsion of the inferior endplate, and focal kyphosis. Scoliosis appears worsened from prior CT PA examination.   Similar appearance of presumed multiple large vertebral body hemangiomas.   MACRO: None   Signed by: Jose Antonio Murray 9/26/2024 7:47 AM Dictation workstation:   [id number]    CT cervical spine wo IV contrast    Result Date: 9/26/2024  Interpreted By:  Jose Antonio Murray, STUDY: CT CERVICAL SPINE WO IV CONTRAST;  9/26/2024 7:07 am   INDICATION: Signs/Symptoms:fall.     COMPARISON: None.   ACCESSION NUMBER(S): [id number]   ORDERING CLINICIAN: OJSE MOSHER   TECHNIQUE: Axial CT images of the cervical spine are obtained. Axial, coronal and sagittal reconstructions are provided for review.   FINDINGS: Trauma: Diffusely decreased bone mineralization. There is  no evidence for an acute fracture of the cervical spine. No prevertebral soft tissue swelling.   Alignment: Slight scoliosis. Otherwise within normal limits.   Craniocervical Junction: The occipital condyles, odontoid process, and craniocervical junction are intact.   Vertebral Body Heights: The cervical vertebral body heights are intact.   Intervertebral Disc Spaces: Moderate degenerative disc height loss at C5-C6.   Degenerative Change: Minimal/mild multilevel facet arthropathy. Element of congenital spinal canal stenosis with mild-to-moderate multilevel narrowing. Scattered mild-to-moderate neural foraminal narrowing within the lower cervical regions.   Prevertebral/Paraspinal Soft Tissues: The prevertebral and paraspinal soft tissues are otherwise unremarkable.       No evidence for an acute fracture or subluxation of the cervical spine.   MACRO: None   Signed by: Jose Antonio Murray 9/26/2024 7:37 AM Dictation workstation:   [id number]    CT head W O contrast trauma protocol    Result Date: 9/26/2024  Interpreted By:  Jose Antonio Murray, STUDY: CT HEAD W/O CONTRAST TRAUMA PROTOCOL; CT ORBIT W IV CONTRAST; 9/26/2024 7:07 am   INDICATION: Signs/Symptoms:fall; Signs/Symptoms:C/F EYE TRAUMA, HAS LEFT HYPHEMA.     COMPARISON: CT head dated 06/17/2023.   ACCESSION NUMBER(S): [id number]; [id number]   ORDERING CLINICIAN: JOSE MOSHER   TECHNIQUE: Noncontrast axial CT scan of head was performed. Coronal and sagittal reformats provided.   Dedicated CT imaging of the orbits was also performed following administration of 70 mL Omnipaque 350 intravenous contrast. Coronal and sagittal reformats were provided..   FINDINGS: Parenchyma: There is no intracranial hemorrhage. The grey-white differentiation is intact. There is no mass effect or midline shift. Moderate to advanced chronic small vessel ischemic disease. Tiny remote bilateral basal ganglia lacunar infarcts. Small remote left inferior cerebellar infarct.   CSF Spaces:  The ventricles, sulci and basal cisterns are within normal limits for age with moderate to advanced generalized brain atrophy.   Extra-Axial Fluid: There is no extraaxial fluid collection.   Calvarium: The calvarium is unremarkable.   Paranasal sinuses: Visualized paranasal sinuses are clear.   Mastoids: Clear.   Orbits: As compared with previous examination there is abnormal morphology/appearance of the globe with shrunken appearance that may reflect globe rupture. Patient with previous left native lens extraction as evident on comparison exam. No radiopaque orbital foreign body. No orbital fracture. The optic nerve and extraocular muscles are otherwise unremarkable in CT appearance. The intraconal fat is relatively well preserved. Lacrimal gland is unremarkable. Right globe and orbital structures are normal in CT appearance. No abnormal masslike enhancement of the orbits.   Soft tissues: Unremarkable.     Brain Injury (BIG) guidelines CT values:   Skull fracture: No SDH (subdural hematoma): None detected EDH (epidural hematoma): None detected IPH (intraparenchymal hemorrhage): None detected SAH (subarachnoid hemorrhage): None detected IVH (intraventricular hemorrhage): No   Reference: Kris FU, Primitivo RS, Edwige M, et al. The BIG (brain injury guidelines) project: defining the management of traumatic brain injury by acute care surgeons. J Trauma Acute Care Surg. 2014;76:105m890.       No acute intracranial hemorrhage, mass effect, calvarial fracture, or CT apparent acute infarct.   Findings suggestive of left globe rupture. No orbital fracture component. No radiopaque orbital foreign body. No abnormal enhancement.   Moderate to advanced chronic small vessel ischemic disease and generalized brain atrophy. Tiny remote bilateral basal ganglia lacunar infarcts and remote left cerebellar infarct.   MACRO: None   Signed by: Jose Antonio Murray 9/26/2024 7:32 AM Dictation workstation:   [id number]    CT orbit w IV  contrast    Result Date: 9/26/2024  Interpreted By:  Jose Antonio Murray, STUDY: CT HEAD W/O CONTRAST TRAUMA PROTOCOL; CT ORBIT W IV CONTRAST; 9/26/2024 7:07 am   INDICATION: Signs/Symptoms:fall; Signs/Symptoms:C/F EYE TRAUMA, HAS LEFT HYPHEMA.     COMPARISON: CT head dated 06/17/2023.   ACCESSION NUMBER(S): [id number]; [id number]   ORDERING CLINICIAN: JOSE MOSHER   TECHNIQUE: Noncontrast axial CT scan of head was performed. Coronal and sagittal reformats provided.   Dedicated CT imaging of the orbits was also performed following administration of 70 mL Omnipaque 350 intravenous contrast. Coronal and sagittal reformats were provided..   FINDINGS: Parenchyma: There is no intracranial hemorrhage. The grey-white differentiation is intact. There is no mass effect or midline shift. Moderate to advanced chronic small vessel ischemic disease. Tiny remote bilateral basal ganglia lacunar infarcts. Small remote left inferior cerebellar infarct.   CSF Spaces: The ventricles, sulci and basal cisterns are within normal limits for age with moderate to advanced generalized brain atrophy.   Extra-Axial Fluid: There is no extraaxial fluid collection.   Calvarium: The calvarium is unremarkable.   Paranasal sinuses: Visualized paranasal sinuses are clear.   Mastoids: Clear.   Orbits: As compared with previous examination there is abnormal morphology/appearance of the globe with shrunken appearance that may reflect globe rupture. Patient with previous left native lens extraction as evident on comparison exam. No radiopaque orbital foreign body. No orbital fracture. The optic nerve and extraocular muscles are otherwise unremarkable in CT appearance. The intraconal fat is relatively well preserved. Lacrimal gland is unremarkable. Right globe and orbital structures are normal in CT appearance. No abnormal masslike enhancement of the orbits.   Soft tissues: Unremarkable.     Brain Injury (BIG) guidelines CT values:   Skull fracture:  No SDH (subdural hematoma): None detected EDH (epidural hematoma): None detected IPH (intraparenchymal hemorrhage): None detected SAH (subarachnoid hemorrhage): None detected IVH (intraventricular hemorrhage): No   Reference: Kris FU, Primitivo RS, Edwige M, et al. The BIG (brain injury guidelines) project: defining the management of traumatic brain injury by acute care surgeons. J Trauma Acute Care Surg. 2014;76:844m677.       No acute intracranial hemorrhage, mass effect, calvarial fracture, or CT apparent acute infarct.   Findings suggestive of left globe rupture. No orbital fracture component. No radiopaque orbital foreign body. No abnormal enhancement.   Moderate to advanced chronic small vessel ischemic disease and generalized brain atrophy. Tiny remote bilateral basal ganglia lacunar infarcts and remote left cerebellar infarct.   MACRO: None   Signed by: Jose Antonio Murray 9/26/2024 7:32 AM Dictation workstation:   [id number]    XR pelvis 1-2 views    Result Date: 9/26/2024  * * *Final Report* * * DATE OF EXAM: Sep 26 2024  5:59AM   EUX   [id number]  -  XR PELVIS 1V AP  / ACCESSION #  [id number] PROCEDURE REASON: Pelvic trauma      * * * * Physician Interpretation * * * * RESULT: XR PELVIS 1V AP CLINICAL HISTORY: PATIENT/TECHNOLOGIST PROVIDED HISTORY:   er sts fall CLINICAL INFORMATION (AS PROVIDED BY ORDERING CLINICIAN) :  Pelvic trauma COMPARISON:  None. TECHNIQUE: AP view pelvis, one film. RESULT: Osteopenia.  The bony pelvis is intact.  Pubic symphysis and sacroiliac joints appear maintained.  No evidence of right or left hip joint dislocation or acute fracture.    IMPRESSION: No acute pelvic fracture identified. Transcribed Using Voice Recognition Transcribe Date/Time: Sep 26 2024  6:13A Dictated by: DINORA EDWARDS MD This examination was interpreted and the report reviewed and electronically signed by: DINORA EDWARDS MD on Sep 26 2024  6:14AM  EST      Medications     Scheduled medications  cefTAZidime, 1 g,  intravenous, q12h  levoFLOXacin, 500 mg, oral, q24h [facility]  sennosides-docusate sodium, 2 tablet, oral, BID  vancomycin, 1,000 mg, intravenous, q12h      Continuous medications     PRN medications      PLAN     Ms Mancera is a 89-year-old female with PMHx: HFpEF, COPD, HTN, dementia who presented to the ED today after falling out of bed.  On at bedside and acts as historian along with patient.  Per son patient rolled out of bed and hit her chair.  States she fell on her head denies any LOC but does complain of left eye pain swelling.    While in the ED her vitals were stable and WNL labs were also WNL for patient.  Hgb 9.4 and platelets 70 which is baseline.  Patient was pan scanned and found to have a left globe rupture and ophthalmology was consulted in the ED.  Patient to be admitted for fall.  Assessment/Plan:    Left globe rupture  Fall  -Ophthalmology consult and appreciate recs  -N.p.o. for surgery  -PT OT eval and treat    Chronic Thrombocytopenia  Chronic anemia   -both stable at baseline  -Plt 70, Hgb 9.5    HFpEF  HTN  -Stable BP  -No home medications  -Last echo 4/14/2022 EF over 66%    Dementia  -Continue home Lexapro 10 mg daily  -Hold home donepezil-per son patient is not actually taking this med she refuses it every day    COPD  -Stable no medications    Fluids: monitor and replete as needed  Electrolytes: monitor and replete as needed  Nutrition: NPO for surgery  GI prophylaxis: as needed  DVT prophylaxis: SCD  Code Status: full code as discussed with son on admission  PCP  Pharmacy    Disposition:  Admitted for above diagnoses. Plan per above. Anticipate hospitalization >2 midnights.       Edith Villeda, APRN-CNP         I spent 75 minutes in the professional and overall care on this encounter before, during and after the visit, examining the patient, reviewing labs, writing orders and documenting the note

## 2024-09-26 NOTE — SIGNIFICANT EVENT
RCRI    ELEVATED SURGERY RISK No  HX OF ISCHEMIC HEART DISEASE No  HX OF CONGESTIVE HEART FAILURE Yes  HX OF CVA Yes  PRE OP INSULIN No  PRE OP CREAT >2MG/DL No      2 POINTS CLASS III RISK  10.1% 30 DAY RISK OF DEATH, mi OR CARDIAC ARREST    DASI Score    Take care of self Yes  Walk indoors Yes  Walk 1-2 blocks on level ground Yes  Climb a flight of stairs/up a hill Yes  Run a short distance No  Do light work around the house Yes  Do moderate work around the house Yes  Do heavy work around the house No  Do yardwork No  Have sexual relations No  Participate in moderate rec activities No  Participate in strenous rec activities No    18.95 points    5.07 Mets    Patient is surgically optimized   No Further Testing needed at this time    Chronic anemia and chronic thrombocytopenia at baseline- will order Type and Screen and monitor labs post op      VERONICA Mullins-CNP

## 2024-09-26 NOTE — PROGRESS NOTES
Vancomycin Dosing by Pharmacy- INITIAL    Patrica Mancera is a 89 y.o. year old female who Pharmacy has been consulted for vancomycin dosing for other ophthalmology  . Based on the patient's indication and renal status this patient will be dosed based on a goal AUC of 400-600.     Renal function is currently stable.    Visit Vitals  /68   Pulse 69   Temp 36.9 °C (98.4 °F) (Temporal)   Resp 20        Lab Results   Component Value Date    CREATININE 0.49 (L) 2024    CREATININE 0.66 2024    CREATININE 0.62 2023    CREATININE 0.61 2023    CREATININE 0.67 2023    CREATININE 0.66 2023    CREATININE 0.77 2023        Patient weight is as follows:   Vitals:    24 0634   Weight: (!) 34.4 kg (75 lb 13.4 oz)       Cultures:  No results found for the encounter in last 14 days.        No intake/output data recorded.  I/O during current shift:  No intake/output data recorded.    Temp (24hrs), Av °C (98.6 °F), Min:36.8 °C (98.3 °F), Max:37.2 °C (99 °F)         Assessment/Plan     Patient received a 1g dose in the ED this morning (~30mg/kg), due to patients small size (34kg), age and renal function will not be ordering doses at this time. Will check an AM level to check for accumulation. Per insightrx, dosing should be 500mg q24h, instead of the 1g q12h that was previously ordered.    This dosing regimen is predicted by [employer]ight[employer] to result in the following pharmacokinetic parameters:      Follow-up level will be ordered on  at 0500 unless clinically indicated sooner.  Will continue to monitor renal function daily while on vancomycin and order serum creatinine at least every 48 hours if not already ordered.  Follow for continued vancomycin needs, clinical response, and signs/symptoms of toxicity.       Malathi Ruiz, PharmD   [facility] Pharmacist

## 2024-09-26 NOTE — PROGRESS NOTES
"Patrica Mancera is a 89 y.o. female on day 0 of admission presenting with Fall, initial encounter.      Subjective   Status post (s/p) left globe exploration and repair with repaired corneal laceration.        Objective     Last Recorded Vitals  Blood pressure 132/65, pulse 68, temperature 36.6 °C (97.9 °F), temperature source Axillary, resp. rate 16, height 1.575 m (5' 2\"), weight (!) 34.4 kg (75 lb 13.4 oz), SpO2 95%.    Physical Exam  Base Eye Exam       Visual Acuity    Unable to assess due to severe dementia             Tonometry (Tonopen, 8:43 AM)         Right Left    Pressure 15 deferred              Pupils         Dark Light Shape React APD    Right 3 2 Round Brisk None    Left                   Visual Fields    Unable             Extraocular Movement    Unable             Neuro/Psych    Demented, not oriented              Dilation       Right eye: 1% Mydriacyl & 2.5% Adelso  @ 8:43 AM                  Additional Tests       Color    Unable                 Slit Lamp and Fundus Exam       External Exam         Right Left    External Normal Sunken globe              Slit Lamp Exam         Right Left    Lids/Lashes Normal Water-y red discharge    Conjunctiva/Sclera White and quiet Hemorrhagic chemosis, most prom rojelio and temp, slightly bullous    Cornea Arcus temporal limbal laceration full thickness    Anterior Chamber Deep and quiet Total hyphema    Iris Round and reactive unable to visualize details    Lens Phakic 1-2+ NS unable to visualize details              Fundus Exam         Right Left    Posterior Vitreous Normal     Disc Normal     C/D Ratio 0.3     Macula Normal     Vessels Normal     Periphery Normal, limited view peripherally due to lack of cooperation                     Relevant Results                                     Assessment/Plan   Assessment & Plan  Fall, initial encounter    Ruptured globe, left eye, initial encounter    CVA (cerebral vascular accident) (Multi)      89 year old female " with hx of dementia, HTN, asthma, GERD, Anemia, HFPEF, thrombocytopenia, TIA, polycythemia vera, COPD, NSVT who is presenting to the ED following a fall out of bed with left eye trauma. Exam is consistent with ruptured globe, left eye.     #Globe rupture, left eye  #status post (s/p) left globe exploration and repair (Venkatesh/Rhonda) on 9/26/24  - Mechanism: Face to bedside table (unwitnessed)  - Concern for foreign body (FB) given outside ED history where foreign body (FB) was removed prior to arrival  - CT showing deflated left globe and irregular contour  - Exam under anesthesia revealed a large corneal limbal laceration from 2:00 to 6:00 with vitreous prolapse  - Continue Vancomycin 1g IV q12 hours  - Continue Ceftazidime 1g IV q12 hours  - Start Levofloxacin 500 mg PO x 10 days after discharge  - Keep eye shield and patch overnight  - Start prednisolone 1 % QID in left eye tomorrow  - Start Moxifloxacin QID in left eye tomorrow  - Start atropine daily in left eye tomorrow  - will need to arrange follow up with cornea attending per Dr. Ellis  - will also staff case with retina as well to determine next steps  - will see patient for POD1 tomorrow, will B-scan as well to determine state of posterior segment         Keith Almendarez MD    Discussed and examined with Dr. Rhonda Almendarez MD  Ophthalmology PGY-3    Ophthalmology Adult Pager - [phone]  Ophthalmology Pediatrics Pager - [phone]    For adult follow-up appointments, call: [phone]  For pediatric follow-up appointments, call: [phone]

## 2024-09-26 NOTE — PROGRESS NOTES
Pharmacy Admission Order Reconciliation Review    Patrica Mancera is a 89 y.o. female admitted for Fall, initial encounter. Pharmacy reviewed the patient's unreconciled admission medications.    Prior to admission medications that were reviewed and acted on by the pharmacist include:  Vitamin B6  These medications have been reconciled.     Any other unreconcilied medications have been addressed and will be ordered or held by the patient's medical team. Medications addressed by the pharmacist may be added or changed by the patient's medical team at any time.    Franklin Castillo, PharmD  Transitions of Care Pharmacist  [facility]  Please reach out via Secure Chat for questions

## 2024-09-27 ENCOUNTER — HOME HEALTH ADMISSION (OUTPATIENT)
Dept: HOME HEALTH SERVICES | Facility: HOME HEALTH | Age: 89
End: 2024-09-27
Payer: MEDICARE

## 2024-09-27 ENCOUNTER — DOCUMENTATION (OUTPATIENT)
Dept: HOME HEALTH SERVICES | Facility: HOME HEALTH | Age: 89
End: 2024-09-27
Payer: MEDICARE

## 2024-09-27 ENCOUNTER — PHARMACY VISIT (OUTPATIENT)
Dept: PHARMACY | Facility: CLINIC | Age: 89
End: 2024-09-27
Payer: COMMERCIAL

## 2024-09-27 VITALS
HEIGHT: 62 IN | OXYGEN SATURATION: 95 % | DIASTOLIC BLOOD PRESSURE: 70 MMHG | TEMPERATURE: 97.9 F | HEART RATE: 79 BPM | BODY MASS INDEX: 13.96 KG/M2 | SYSTOLIC BLOOD PRESSURE: 120 MMHG | RESPIRATION RATE: 18 BRPM | WEIGHT: 75.84 LBS

## 2024-09-27 LAB
ALBUMIN SERPL BCP-MCNC: 3.5 G/DL (ref 3.4–5)
ANION GAP SERPL CALC-SCNC: 10 MMOL/L (ref 10–20)
BUN SERPL-MCNC: 12 MG/DL (ref 6–23)
CALCIUM SERPL-MCNC: 8.7 MG/DL (ref 8.6–10.6)
CHLORIDE SERPL-SCNC: 101 MMOL/L (ref 98–107)
CO2 SERPL-SCNC: 32 MMOL/L (ref 21–32)
CREAT SERPL-MCNC: 0.62 MG/DL (ref 0.5–1.05)
EGFRCR SERPLBLD CKD-EPI 2021: 83 ML/MIN/1.73M*2
ERYTHROCYTE [DISTWIDTH] IN BLOOD BY AUTOMATED COUNT: 22.3 % (ref 11.5–14.5)
GLUCOSE SERPL-MCNC: 66 MG/DL (ref 74–99)
HCT VFR BLD AUTO: 32.4 % (ref 36–46)
HGB BLD-MCNC: 9.5 G/DL (ref 12–16)
MCH RBC QN AUTO: 24.1 PG (ref 26–34)
MCHC RBC AUTO-ENTMCNC: 29.3 G/DL (ref 32–36)
MCV RBC AUTO: 82 FL (ref 80–100)
NRBC BLD-RTO: 2.3 /100 WBCS (ref 0–0)
PHOSPHATE SERPL-MCNC: 3.8 MG/DL (ref 2.5–4.9)
PLATELET # BLD AUTO: 83 X10*3/UL (ref 150–450)
POTASSIUM SERPL-SCNC: 4.1 MMOL/L (ref 3.5–5.3)
RBC # BLD AUTO: 3.95 X10*6/UL (ref 4–5.2)
SODIUM SERPL-SCNC: 139 MMOL/L (ref 136–145)
VANCOMYCIN SERPL-MCNC: 7.4 UG/ML (ref 5–20)
WBC # BLD AUTO: 15.1 X10*3/UL (ref 4.4–11.3)

## 2024-09-27 PROCEDURE — 80069 RENAL FUNCTION PANEL: CPT | Performed by: NURSE PRACTITIONER

## 2024-09-27 PROCEDURE — 2500000004 HC RX 250 GENERAL PHARMACY W/ HCPCS (ALT 636 FOR OP/ED): Mod: SE

## 2024-09-27 PROCEDURE — 97161 PT EVAL LOW COMPLEX 20 MIN: CPT | Mod: GP

## 2024-09-27 PROCEDURE — 85027 COMPLETE CBC AUTOMATED: CPT | Performed by: NURSE PRACTITIONER

## 2024-09-27 PROCEDURE — 97116 GAIT TRAINING THERAPY: CPT | Mod: GP

## 2024-09-27 PROCEDURE — 99239 HOSP IP/OBS DSCHRG MGMT >30: CPT | Performed by: INTERNAL MEDICINE

## 2024-09-27 PROCEDURE — 1210000001 HC SEMI-PRIVATE ROOM DAILY

## 2024-09-27 PROCEDURE — 36415 COLL VENOUS BLD VENIPUNCTURE: CPT | Performed by: NURSE PRACTITIONER

## 2024-09-27 PROCEDURE — 2500000001 HC RX 250 WO HCPCS SELF ADMINISTERED DRUGS (ALT 637 FOR MEDICARE OP): Performed by: NURSE PRACTITIONER

## 2024-09-27 PROCEDURE — 2500000001 HC RX 250 WO HCPCS SELF ADMINISTERED DRUGS (ALT 637 FOR MEDICARE OP)

## 2024-09-27 PROCEDURE — 2500000005 HC RX 250 GENERAL PHARMACY W/O HCPCS: Performed by: NURSE PRACTITIONER

## 2024-09-27 PROCEDURE — 97165 OT EVAL LOW COMPLEX 30 MIN: CPT | Mod: GO

## 2024-09-27 PROCEDURE — 80202 ASSAY OF VANCOMYCIN: CPT

## 2024-09-27 PROCEDURE — 97530 THERAPEUTIC ACTIVITIES: CPT | Mod: GO

## 2024-09-27 PROCEDURE — RXMED WILLOW AMBULATORY MEDICATION CHARGE

## 2024-09-27 RX ORDER — PREDNISOLONE ACETATE 10 MG/ML
1 SUSPENSION/ DROPS OPHTHALMIC 4 TIMES DAILY
Status: DISCONTINUED | OUTPATIENT
Start: 2024-09-27 | End: 2024-09-27 | Stop reason: HOSPADM

## 2024-09-27 RX ORDER — ATROPINE SULFATE 10 MG/ML
1 SOLUTION/ DROPS OPHTHALMIC DAILY
Qty: 10 ML | Refills: 0 | Status: SHIPPED | OUTPATIENT
Start: 2024-09-28

## 2024-09-27 RX ORDER — ATROPINE SULFATE 10 MG/ML
1 SOLUTION/ DROPS OPHTHALMIC DAILY
Qty: 5 ML | Refills: 0 | Status: SHIPPED | OUTPATIENT
Start: 2024-09-27 | End: 2024-09-27 | Stop reason: HOSPADM

## 2024-09-27 RX ORDER — MOXIFLOXACIN 5 MG/ML
1 SOLUTION/ DROPS OPHTHALMIC 4 TIMES DAILY
Qty: 10 ML | Refills: 0 | Status: SHIPPED | OUTPATIENT
Start: 2024-09-27

## 2024-09-27 RX ORDER — ATROPINE SULFATE 10 MG/ML
1 SOLUTION/ DROPS OPHTHALMIC DAILY
Status: DISCONTINUED | OUTPATIENT
Start: 2024-09-27 | End: 2024-09-27 | Stop reason: HOSPADM

## 2024-09-27 RX ORDER — PREDNISOLONE ACETATE 10 MG/ML
1 SUSPENSION/ DROPS OPHTHALMIC 4 TIMES DAILY
Qty: 5 ML | Refills: 1 | Status: SHIPPED | OUTPATIENT
Start: 2024-09-27 | End: 2024-09-27 | Stop reason: HOSPADM

## 2024-09-27 RX ORDER — PREDNISOLONE ACETATE 10 MG/ML
1 SUSPENSION/ DROPS OPHTHALMIC 4 TIMES DAILY
Qty: 10 ML | Refills: 0 | Status: SHIPPED | OUTPATIENT
Start: 2024-09-27

## 2024-09-27 RX ORDER — MOXIFLOXACIN 5 MG/ML
1 SOLUTION/ DROPS OPHTHALMIC 4 TIMES DAILY
Qty: 3 ML | Refills: 0 | Status: SHIPPED | OUTPATIENT
Start: 2024-09-27 | End: 2024-09-27 | Stop reason: HOSPADM

## 2024-09-27 RX ORDER — PREDNISOLONE ACETATE 10 MG/ML
1 SUSPENSION/ DROPS OPHTHALMIC 4 TIMES DAILY
Qty: 5 ML | Refills: 1 | Status: SHIPPED | OUTPATIENT
Start: 2024-09-28 | End: 2024-09-27

## 2024-09-27 RX ORDER — VANCOMYCIN HYDROCHLORIDE 1 G/200ML
1000 INJECTION, SOLUTION INTRAVENOUS EVERY 24 HOURS
Status: DISCONTINUED | OUTPATIENT
Start: 2024-09-27 | End: 2024-09-27

## 2024-09-27 RX ORDER — MOXIFLOXACIN 5 MG/ML
1 SOLUTION/ DROPS OPHTHALMIC 4 TIMES DAILY
Status: DISCONTINUED | OUTPATIENT
Start: 2024-09-27 | End: 2024-09-27 | Stop reason: HOSPADM

## 2024-09-27 RX ORDER — MOXIFLOXACIN 5 MG/ML
1 SOLUTION/ DROPS OPHTHALMIC 4 TIMES DAILY
Qty: 3 ML | Refills: 0 | Status: SHIPPED | OUTPATIENT
Start: 2024-09-28 | End: 2024-09-27

## 2024-09-27 RX ORDER — ATROPINE SULFATE 10 MG/ML
1 SOLUTION/ DROPS OPHTHALMIC DAILY
Qty: 5 ML | Refills: 0 | Status: SHIPPED | OUTPATIENT
Start: 2024-09-28 | End: 2024-09-27

## 2024-09-27 RX ORDER — LEVOFLOXACIN 500 MG/1
500 TABLET, FILM COATED ORAL
Qty: 10 TABLET | Refills: 0 | Status: SHIPPED | OUTPATIENT
Start: 2024-09-27 | End: 2024-10-07

## 2024-09-27 RX ADMIN — MOXIFLOXACIN OPHTHALMIC 1 DROP: 5 SOLUTION/ DROPS OPHTHALMIC at 13:51

## 2024-09-27 RX ADMIN — VANCOMYCIN HYDROCHLORIDE 1000 MG: 1 INJECTION, SOLUTION INTRAVENOUS at 09:52

## 2024-09-27 RX ADMIN — ACETAMINOPHEN 975 MG: 325 TABLET ORAL at 06:41

## 2024-09-27 RX ADMIN — SENNOSIDES AND DOCUSATE SODIUM 2 TABLET: 50; 8.6 TABLET ORAL at 09:52

## 2024-09-27 RX ADMIN — ESCITALOPRAM OXALATE 10 MG: 10 TABLET ORAL at 13:50

## 2024-09-27 RX ADMIN — MOXIFLOXACIN OPHTHALMIC 1 DROP: 5 SOLUTION/ DROPS OPHTHALMIC at 16:37

## 2024-09-27 RX ADMIN — PREDNISOLONE ACETATE 1 DROP: 10 SUSPENSION/ DROPS OPHTHALMIC at 16:42

## 2024-09-27 RX ADMIN — POLYETHYLENE GLYCOL 3350 17 G: 17 POWDER, FOR SOLUTION ORAL at 09:53

## 2024-09-27 RX ADMIN — LEVOFLOXACIN 500 MG: 500 TABLET, FILM COATED ORAL at 11:02

## 2024-09-27 RX ADMIN — ATROPINE SULFATE 1 DROP: 10 SOLUTION/ DROPS OPHTHALMIC at 13:51

## 2024-09-27 RX ADMIN — ACETAMINOPHEN 975 MG: 325 TABLET ORAL at 13:55

## 2024-09-27 RX ADMIN — PREDNISOLONE ACETATE 1 DROP: 10 SUSPENSION/ DROPS OPHTHALMIC at 13:51

## 2024-09-27 ASSESSMENT — PAIN - FUNCTIONAL ASSESSMENT
PAIN_FUNCTIONAL_ASSESSMENT: PAINAD (PAIN ASSESSMENT IN ADVANCED DEMENTIA SCALE)
PAIN_FUNCTIONAL_ASSESSMENT: 0-10
PAIN_FUNCTIONAL_ASSESSMENT: 0-10

## 2024-09-27 ASSESSMENT — COGNITIVE AND FUNCTIONAL STATUS - GENERAL
DAILY ACTIVITIY SCORE: 12
DRESSING REGULAR UPPER BODY CLOTHING: A LITTLE
PERSONAL GROOMING: A LITTLE
MOVING TO AND FROM BED TO CHAIR: A LITTLE
TURNING FROM BACK TO SIDE WHILE IN FLAT BAD: A LITTLE
TURNING FROM BACK TO SIDE WHILE IN FLAT BAD: A LITTLE
DRESSING REGULAR LOWER BODY CLOTHING: A LITTLE
DRESSING REGULAR UPPER BODY CLOTHING: A LITTLE
TOILETING: A LOT
EATING MEALS: A LOT
DRESSING REGULAR UPPER BODY CLOTHING: A LOT
MOVING FROM LYING ON BACK TO SITTING ON SIDE OF FLAT BED WITH BEDRAILS: A LITTLE
STANDING UP FROM CHAIR USING ARMS: A LITTLE
DRESSING REGULAR LOWER BODY CLOTHING: A LITTLE
TURNING FROM BACK TO SIDE WHILE IN FLAT BAD: A LOT
WALKING IN HOSPITAL ROOM: A LITTLE
MOVING FROM LYING ON BACK TO SITTING ON SIDE OF FLAT BED WITH BEDRAILS: A LITTLE
HELP NEEDED FOR BATHING: A LITTLE
HELP NEEDED FOR BATHING: A LOT
PERSONAL GROOMING: A LITTLE
MOVING TO AND FROM BED TO CHAIR: A LOT
DAILY ACTIVITIY SCORE: 19
WALKING IN HOSPITAL ROOM: A LOT
PERSONAL GROOMING: A LOT
STANDING UP FROM CHAIR USING ARMS: A LITTLE
TOILETING: A LITTLE
CLIMB 3 TO 5 STEPS WITH RAILING: A LOT
EATING MEALS: A LITTLE
MOBILITY SCORE: 12
MOVING FROM LYING ON BACK TO SITTING ON SIDE OF FLAT BED WITH BEDRAILS: A LITTLE
CLIMB 3 TO 5 STEPS WITH RAILING: TOTAL
HELP NEEDED FOR BATHING: A LOT
CLIMB 3 TO 5 STEPS WITH RAILING: TOTAL
DRESSING REGULAR LOWER BODY CLOTHING: A LOT
TOILETING: A LOT
MOVING TO AND FROM BED TO CHAIR: A LITTLE
MOBILITY SCORE: 15
DAILY ACTIVITIY SCORE: 16
STANDING UP FROM CHAIR USING ARMS: A LOT
WALKING IN HOSPITAL ROOM: A LOT
MOBILITY SCORE: 17

## 2024-09-27 ASSESSMENT — PAIN SCALES - GENERAL
PAINLEVEL_OUTOF10: 0 - NO PAIN

## 2024-09-27 ASSESSMENT — ACTIVITIES OF DAILY LIVING (ADL): LACK_OF_TRANSPORTATION: NO

## 2024-09-27 NOTE — PROGRESS NOTES
"Patrica Mancera is a 89 y.o. female on day 1 of admission presenting with Fall, initial encounter.      Subjective   Seen at bedside this morning. No acute events overnight.       Objective     Last Recorded Vitals  Blood pressure 122/65, pulse 74, temperature 36.3 °C (97.3 °F), resp. rate 17, height 1.575 m (5' 2\"), weight (!) 34.4 kg (75 lb 13.4 oz), SpO2 93%.    Physical Exam  Base Eye Exam       Visual Acuity    Unable to assess due to severe dementia             Tonometry (Tonopen, 8:43 AM)         Right Left    Pressure 15 deferred              Pupils         Dark Light Shape React APD    Right 3 2 Round Brisk None    Left                   Visual Fields    Unable             Extraocular Movement    Unable             Neuro/Psych    Demented, not oriented              Dilation       Right eye: 1% Mydriacyl & 2.5% Adelso  @ 8:43 AM                  Additional Tests       Color    Unable                 Slit Lamp and Fundus Exam       External Exam         Right Left    External Normal Sunken globe              Slit Lamp Exam         Right Left    Lids/Lashes Normal Water-y red discharge    Conjunctiva/Sclera White and quiet Hemorrhagic chemosis, most prom rojelio and temp, slightly bullous    Cornea Arcus temporal limbal laceration full thickness    Anterior Chamber Deep and quiet Total hyphema    Iris Round and reactive unable to visualize details    Lens Phakic 1-2+ NS unable to visualize details              Fundus Exam         Right Left    Posterior Vitreous Normal     Disc Normal     C/D Ratio 0.3     Macula Normal     Vessels Normal     Periphery Normal, limited view peripherally due to lack of cooperation                     B scan with deformation in posterior chamber revealing irregular contour but formed appearing AC      Assessment/Plan   Assessment & Plan  Fall, initial encounter    Ruptured globe, left eye, initial encounter    CVA (cerebral vascular accident) (Multi)    Rupture of globe of eye " following blunt trauma, left, initial encounter    89 year old female with hx of dementia, HTN, asthma, GERD, Anemia, HFPEF, thrombocytopenia, TIA, polycythemia vera, COPD, NSVT who is presenting to the ED following a fall out of bed with left eye trauma. Exam is consistent with ruptured globe, left eye.     #Globe rupture, left eye  #status post (s/p) left globe exploration and repair (Venkatesh/Rhonda) on 9/26/24  - Mechanism: Face to bedside table (unwitnessed)  - Concern for foreign body (FB) given outside ED history where foreign body (FB) was removed prior to arrival  - CT showing deflated left globe and irregular contour  - Exam under anesthesia revealed a large corneal limbal laceration from 2:00 to 6:00 with vitreous prolapse  - Continue Vancomycin 1g IV q12 hours  - Continue Ceftazidime 1g IV q12 hours  - Start Levofloxacin 500 mg PO x 10 days after discharge  - Keep eye shield and patch on until follow up appointment, ok to remove for eye drop administration  - Start prednisolone 1 % QID in left eye today  - Start Moxifloxacin QID in left eye today  - Start atropine daily in left eye today  - will need to arrange follow up with cornea attending per Dr. Ellis. Ophthalmology to alert Son as soon as appointment is confirmed for follow up  - will also staff case with retina as well to determine next steps    Stable for discharge from ophthalmology standpoint     MEDICINE TEAM CO-MANAGING WITH US, IF ANY ACUTE MEDICAL ISSUES ARISE THAT ARE NON EYE RELATED PLEASE REACH OUT TO MEDICINE PAGER OVERNIGHT (pager [phone]), ALSO MEDICINE WILL BE RESPONSIBLE FOR PLACING ANY NON-EYE RELATED MEDICINES        Wayne Suresh MD     Patient examined and discussed with Keith Riddle     Ophthalmology Adult Pager - [phone]  Ophthalmology Pediatrics Pager - [phone]     For adult follow-up appointments, call: [phone]  For pediatric follow-up appointments, call: [phone]

## 2024-09-27 NOTE — PROGRESS NOTES
Vancomycin Dosing by Pharmacy- Cessation of Therapy    Consult to pharmacy for vancomycin dosing has been discontinued by the prescriber, pharmacy will sign off at this time.    Please call pharmacy if there are further questions or re-enter a consult if vancomycin is resumed.     Grey Daniels, VickiD

## 2024-09-27 NOTE — DISCHARGE SUMMARY
Discharge Diagnosis  Fall, initial encounter    Issues Requiring Follow-Up  Open globe, left eye: Ophthalmology to arrange for close follow up with Cornea vs comprehensive ophthalmology physician next week    Test Results Pending At Discharge  Pending Labs       Order Current Status    CBC In process            Hospital Course   89 year old female with hx of dementia, HTN, asthma, GERD, Anemia, HFPEF, thrombocytopenia, TIA, polycythemia vera, COPD, NSVT who is presenting to the ED following a fall out of bed with left eye trauma. She was found to have open globe left eye and was taken to the OR for urgent exploration and repair where her anterior chamber anatomy was restored as able. She was monitored overnight for complications which there were none. She is stable for discharge with close outpatient ophthalmology follow up.     Pertinent Physical Exam At Time of Discharge  Physical Exam  Constitutional:       Appearance: Normal appearance.      Comments: frail   Eyes:      Comments: Left eye diffuse subconjunctival hemorrhage, more formed appearing globe   Neurological:      Mental Status: Mental status is at baseline.         Home Medications     Medication List      START taking these medications     atropine 1 % ophthalmic solution; Administer 1 drop into the left eye   once daily. Do not fill before September 28, 2024.; Start taking on:   September 28, 2024   levoFLOXacin 500 mg tablet; Commonly known as: Levaquin; Take 1 tablet   (500 mg) by mouth once every 24 hours for 10 days.   moxifloxacin 0.5 % ophthalmic solution; Commonly known as: Vigamox;   Administer 1 drop into the left eye 4 times a day. Do not fill before   September 28, 2024.; Start taking on: September 28, 2024   prednisoLONE acetate 1 % ophthalmic suspension; Commonly known as:   Pred-Forte; Administer 1 drop into the left eye 4 times a day. Do not fill   before September 28, 2024.; Start taking on: September 28, 2024     CONTINUE taking these  medications     escitalopram 10 mg tablet; Commonly known as: Lexapro; Take 1 tablet (10   mg) by mouth once daily. (STOP taking the 5 mg tablet daily)   pyridoxine 100 mg tablet; Commonly known as: Vitamin B-6     ASK your doctor about these medications     donepezil 5 mg tablet; Commonly known as: Aricept; Take one tablet by   mouth in the evening or at bedtime       Outpatient Follow-Up  No future appointments.    Wayne Suresh MD

## 2024-09-27 NOTE — NURSING NOTE
Discharge summary reviewed with pt. Reggie Klein @ bedside. Pt. Son aware of time and which eye drops is to be placed in her left eye. Son also made aware pt. Has antibiotics to be taken for the next 10 days. Medications from Meds to Bed Delivered and given to son. PT. Dressed in home attire and assisted to wheelchair with pt. Transport. Pt. Left division accompanied by reggiee and transport to home.

## 2024-09-27 NOTE — DISCHARGE INSTRUCTIONS
Keep shield on left eye until follow up appointment, except for eye drop administration    We will call you to arrange for ophthalmology follow up once scheduled    Please start prednisolone drops 4x per day left eye starting 9/28  Please start moxifloxacin drops 4x per day left eye starting 9/28  Please start atropine drops 1x per day left eye starting 9/28

## 2024-09-27 NOTE — PROGRESS NOTES
Spoke with patient son Ernie to discuss PT recommendations for moderate intensity therapy at discharge. Discussed differences between acute rehab, skilled nursing facility, home health care and outpatient therapy. Son states he would prefer patient discharge home with home health care as he has had bad experiences with SNFs in the past. Educated regarding freedom of choice and provided financial disclosure. Son agreeable to referral to [facility]. MD updated and to place orders. Per MD she is medically ready for discharge. This TCC discussed son request for hospital bed and MD to enter order. Transport pending via roundtrip.  Geri BROWNE, RN  Transitional Care Coordinator (TCC)  [phone]

## 2024-09-27 NOTE — H&P
History Of Present Illness  Patrica Mancera is a 89 y.o. female presenting with Status post (s/p) left globe exploration and repair with repaired corneal laceration. .     Past Medical History  She has a past medical history of Asthma ([facility]), COPD (chronic obstructive pulmonary disease) (Multi), Hypertension, and Transient cerebral ischemic attack, unspecified.    Surgical History  She has a past surgical history that includes MR angio head wo IV contrast (03/07/2015); MR angio neck wo IV contrast (03/07/2015); Hysterectomy; Eye surgery; and Cholecystectomy.     Social History  She reports that she has never smoked. She has never used smokeless tobacco. She reports that she does not drink alcohol and does not use drugs.     Allergies  Darvocet a500 [propoxyphene n-acetaminophen], Darvon [propoxyphene], Propoxyphene-acetaminophen, and Valsartan    ROS  Patient denies ocular pain, redness, discharge, decreased vision, double vision, blind spots, flashes, or floaters.     Physical Exam  Base Eye Exam       Visual Acuity    Unable to assess due to severe dementia             Tonometry (Tonopen, 8:43 AM)         Right Left    Pressure 15 deferred              Pupils         Dark Light Shape React APD    Right 3 2 Round Brisk None    Left                   Visual Fields    Unable             Extraocular Movement    Unable             Neuro/Psych    Demented, not oriented              Dilation       Right eye: 1% Mydriacyl & 2.5% Adelso  @ 8:43 AM                  Additional Tests       Color    Unable                 Slit Lamp and Fundus Exam       External Exam         Right Left    External Normal Sunken globe              Slit Lamp Exam         Right Left    Lids/Lashes Normal Water-y red discharge    Conjunctiva/Sclera White and quiet Hemorrhagic chemosis, most prom rojelio and temp, slightly bullous    Cornea Arcus temporal limbal laceration full thickness    Anterior Chamber Deep and quiet Total hyphema    Iris Round  and reactive unable to visualize details    Lens Phakic 1-2+ NS unable to visualize details              Fundus Exam         Right Left    Posterior Vitreous Normal     Disc Normal     C/D Ratio 0.3     Macula Normal     Vessels Normal     Periphery Normal, limited view peripherally due to lack of cooperation                      Assessment/Plan   Assessment & Plan  Fall, initial encounter    Ruptured globe, left eye, initial encounter    CVA (cerebral vascular accident) (Multi)    Rupture of globe of eye following blunt trauma, left, initial encounter      89 year old female with hx of dementia, HTN, asthma, GERD, Anemia, HFPEF, thrombocytopenia, TIA, polycythemia vera, COPD, NSVT who is presenting to the ED following a fall out of bed with left eye trauma. Exam is consistent with ruptured globe, left eye.     #Globe rupture, left eye  #status post (s/p) left globe exploration and repair (Venkatesh/Rhonda) on 9/26/24  - Mechanism: Face to bedside table (unwitnessed)  - Concern for foreign body (FB) given outside ED history where foreign body (FB) was removed prior to arrival  - CT showing deflated left globe and irregular contour  - Exam under anesthesia revealed a large corneal limbal laceration from 2:00 to 6:00 with vitreous prolapse  - Continue Vancomycin 1g IV q12 hours  - Continue Ceftazidime 1g IV q12 hours  - Start Levofloxacin 500 mg PO x 10 days after discharge  - Keep eye shield and patch overnight  - Start prednisolone 1 % QID in left eye tomorrow  - Start Moxifloxacin QID in left eye tomorrow  - Start atropine daily in left eye tomorrow  - will need to arrange follow up with cornea attending per Dr. Ellis  - will also staff case with retina as well to determine next steps  - will see patient for POD1 tomorrow, will B-scan as well to determine state of posterior segment    MEDICINE TEAM CO-MANAGING WITH US, IF ANY ACUTE MEDICAL ISSUES ARISE THAT ARE NON EYE RELATED PLEASE REACH OUT TO MEDICINE PAGER  OVERNIGHT (pager [phone]), ALSO MEDICINE WILL BE RESPONSIBLE FOR PLACING ANY NON-EYE RELATED MEDICINES           Keith Almendarez MD     Discussed and examined with Dr. Rhonda Almendarez MD  Ophthalmology PGY-3     Ophthalmology Adult Pager - [phone]  Ophthalmology Pediatrics Pager - [phone]     For adult follow-up appointments, call: [phone]  For pediatric follow-up appointments, call: [phone]            Keith Almendarez MD

## 2024-09-27 NOTE — DISCHARGE SUMMARY
Discharge Diagnosis  Fall, initial encounter    Issues Requiring Follow-Up  Follow-up with PCP, and ophthalmology service    Discharge Meds     Medication List      START taking these medications     atropine 1 % ophthalmic solution; Administer 1 drop into the left eye   once daily.; Start taking on: September 28, 2024   levoFLOXacin 500 mg tablet; Commonly known as: Levaquin; Take 1 tablet   (500 mg) by mouth once every 24 hours for 10 days.   moxifloxacin 0.5 % ophthalmic solution; Commonly known as: Vigamox;   Administer 1 drop into the left eye 4 times a day.   prednisoLONE acetate 1 % ophthalmic suspension; Commonly known as:   Pred-Forte; Administer 1 drop into the left eye 4 times a day.     CONTINUE taking these medications     escitalopram 10 mg tablet; Commonly known as: Lexapro; Take 1 tablet (10   mg) by mouth once daily. (STOP taking the 5 mg tablet daily)   pyridoxine 100 mg tablet; Commonly known as: Vitamin B-6     ASK your doctor about these medications     donepezil 5 mg tablet; Commonly known as: Aricept; Take one tablet by   mouth in the evening or at bedtime       Test Results Pending At Discharge  Pending Labs       No current pending labs.            Hospital Course   Ms Mancera is a 89-year-old female with PMHx: HFpEF, COPD, HTN, dementia who presented to the ED today after falling out of bed.  On at bedside and acts as historian along with patient.  Per son patient rolled out of bed and hit her chair.  States she fell on her head denies any LOC but does complain of left eye pain swelling.     While in the ED her vitals were stable and WNL labs were also WNL for patient.  Hgb 9.4 and platelets 70 which is baseline.  Patient was pan scanned and found to have a left globe rupture and ophthalmology was consulted in the ED.  Patient to be admitted for fall.    Patient was admitted for further management with consultation of ophthalmology service.  Per ophthalmology, exam was consistent with left  eye trauma that resulted in ruptured globe of the left eye. CT showing deflated left globe and irregular contour, ophthalmology exam under anesthesia revealed a large corneal limbal laceration from 2:00 to 6:00 with vitreous prolapse Patient underwent left globe exploration and repair on September 26, 2024.  It appeared that they may have been foreign body present, and likely remove in the ED prior to surgical intervention.  Postsurgical intervention, patient was started on levofloxacin 500 mg p.o. daily for 10 days postdischarge.  She is advised to keep her eye shield and patch overnight.  Patient started on prednisolone 1% drop 4 times daily in the left eye, moxifloxacin eyedrop 3 times daily in the left eye and atropine daily in the left eye or to be started tomorrow.  Arrangement to be made for outpatient follow-up with Dr. Ellis.  PT OT consulted, patient evaluated found with decreased strength, decreased endurance, impaired balance, decreased mobility and cognition leading to decreased safety awareness.  Patient tolerated evaluation session well although limited by fatigue.  Recommended moderate intensity level of continue care.  Patient was stable for discharge home with home health, with follow-up with PCP and ophthalmology services as outpatient.  Skilled nursing and PT OT requested with home health orders.  Hospital bed ordered by PCP already.    Greater than 30 minutes were dedicated to this discharge that included clinical discussion and coordinating care.    Pertinent Physical Exam At Time of Discharge  Physical Exam  Constitutional: Emaciated and very pleasant elderly female, conversational, we with some level of confusion, (thought sitter was her niece) alert active, cooperative not in acute distress  Eyes: PERRLA, clear sclera.  Eye shield in place with patch to maintain  ENMT: Moist mucosal membranes, no exudate  Head / Neck: Atraumatic, normocephalic, supple neck, JVP not visualized  Lungs: Patent  airways, CTABL  Heart: RRR, S1S2, no murmurs appreciated, palpable pulses in all extremities  GI: Soft, NT, ND, bowel sounds present in all quadrants  MSK: Moves all extremities freely, with some slight limitation of ROM, no joint edema  Extremities: Intact x 4, no peripheral edema  : No Johnson catheter inserted  Breast: Deferred  Neurological: AAO x 2 to person and place, facial muscles symmetrical, sensation intact, strength weak/appropr, no acute focal neurological deficits appreciated  Psychological: Appropriate mood and behavior    Outpatient Follow-Up  No future appointments.      Christopher Gooden DO

## 2024-09-27 NOTE — CARE PLAN
Problem: Pain - Adult  Goal: Verbalizes/displays adequate comfort level or baseline comfort level  Outcome: Progressing     Problem: Safety - Adult  Goal: Free from fall injury  Outcome: Progressing     Problem: Discharge Planning  Goal: Discharge to home or other facility with appropriate resources  Outcome: Progressing     Problem: Chronic Conditions and Co-morbidities  Goal: Patient's chronic conditions and co-morbidity symptoms are monitored and maintained or improved  Outcome: Progressing     Problem: Skin  Goal: Decreased wound size/increased tissue granulation at next dressing change  Outcome: Progressing  Goal: Participates in plan/prevention/treatment measures  Outcome: Progressing  Goal: Prevent/manage excess moisture  Outcome: Progressing  Goal: Prevent/minimize sheer/friction injuries  Outcome: Progressing  Flowsheets (Taken 9/27/2024 7751)  Prevent/minimize sheer/friction injuries:   HOB 30 degrees or less   Turn/reposition every 2 hours/use positioning/transfer devices  Goal: Promote/optimize nutrition  Outcome: Progressing   The patient's goals for the shift include      The clinical goals for the shift include pts pain will be controlled through shift

## 2024-09-27 NOTE — OP NOTE
Exploration and Repair Ruptured Globe and Corneal laceration repair (L) Operative Note     Date: 2024  OR Location: [facility] OR    Name: Patrica Mancera : 9/10/1931, Age: 89 y.o., MRN: [id number], Sex: female    Diagnosis  Pre-op Diagnosis      * Ruptured globe, left eye, initial encounter [S05.32XA] Post-op Diagnosis     * Ruptured globe, left eye, initial encounter [S05.32XA]     Procedures  Exploration and Repair Ruptured Globe and Corneal laceration repair  02673 - MO RPR LAC CORN&/SCLRA PERF W/REPOS/RESCJ UVEAL T      Surgeons      * Uday Ellis - Primary    Resident/Fellow/Other Assistant:  Surgeons and Role:     * Keith Almendarez MD - Resident - Assisting    Procedure Summary  Anesthesia: General  ASA: III  Anesthesia Staff: Anesthesiologist: Romeo Cabrera MD; Alex Vuong DO  Anesthesia Resident: Bethel Montero MD  Estimated Blood Loss: <1 mL  Intra-op Medications: * Intraprocedure medication information is unavailable because the case start and end events have not been set *           Anesthesia Record               Intraprocedure I/O Totals          Intake    LR bolus 550.00 mL    Total Intake 550 mL          Specimen: No specimens collected     Staff:   Circulator: [place]  Scrub Person: Kavon         Drains and/or Catheters: * None in log *    Tourniquet Times:         Implants:     Findings: corneal and limbal laceration from 2:00 to 6:00 with vitreous prolapse, total anterior chamber (AC) hyphema with clotted material, no lens found    Indications: Patrica Mancera is an 89 y.o. female who is having surgery for Ruptured globe, left eye, initial encounter [S05.32XA].     The patient was seen in the preoperative area. The risks, benefits, complications, treatment options, non-operative alternatives, expected recovery and outcomes were discussed with the patient. The possibilities of reaction to medication, pulmonary aspiration, injury to surrounding structures, bleeding,  recurrent infection, the need for additional procedures, failure to diagnose a condition, and creating a complication requiring transfusion or operation were discussed with the patient. The patient concurred with the proposed plan, giving informed consent.  The site of surgery was properly noted/marked if necessary per policy. The patient has been actively warmed in preoperative area. Preoperative antibiotics are not indicated. Venous thrombosis prophylaxis are not indicated.      The patient was brought to the operating room and was placed in a supine position. After the patient was positively identified through a typical time-out procedure, the patient received anesthesia and was intubated. The face was cleaned with warm saline, then the left eye was prepped with betadine. The patient was then draped in the usual sterile ophthalmic fashion.    Attention was directed to the left eye. A lid speculum was inserted with care note to place pressure on the globe. The left eye showed corneal laceration starting 2:00 o'clock at the limbus extending towards 6:00 o'clock following limbus. Anterior chamber was shallow with total hyphema with blood clots. No iris was appreciated. No lens was appreciated. Rest of conjunctival and sclera were examined and there was low suspicion for another laceration. Weck cells were used to identify prolapsing vitreous from wounds and cut with Vanes scissors. Using a hydro dissection canula BSS was used to irrigate the anterior chamber (AC) and try to clean up some of the hyphema. Blood clot was too formed to be able to be irrigated out. Four single 10-0 nylon sutures were placed to close the corneal wound. The corneal suture knots were then rotated to be buried within the cornea.  Then the anterior chamber (AC) chamber was reformed with more BSS and it stayed formed and deeper, with some of the blood clots falling more posteriorly in the anterior chamber (AC). No leaks were noted.     At the  end, left eye was cleaned and Tobradex ointment was applied. A shield and applied was placed over the eye.     The patient was then awakened and the LMA was removed.   The patient was transferred to the recovery room in good and stable condition.   The patient tolerated the procedure and the anesthesia well.          Procedure Details: globe exploration and repair with corneal laceration repair  Complications:  None; patient tolerated the procedure well.    Disposition: PACU - hemodynamically stable.  Condition: stable         Additional Details: n/a    Attending Attestation:     Uday Ellis  Phone Number: [phone]

## 2024-09-27 NOTE — CONSULTS
"Nutrition Initial Assessment:   Nutrition Assessment    Reason for Assessment: Admission nursing screening    Patient is a 89 y.o. female with PMH of dementia, HTN, GERD, protein-calorie malnutrition presents as limited trauma activation from EMS ground from [facility] s/p roll out of bed, face strike without LOC.    9/26/24 Exploration and Repair Ruptured Globe and Corneal laceration repair (Left).     Nutrition History:  Energy Intake: Fair 50-75 %  Food and Nutrient History: Pt is poor historian. Pt reports she had 100% of breakfast this morning. Reports usually consumes x2 meals per day. States has not tried Boost/Ensure supplements before but was agreeable to trying them.  Food Allergies/Intolerances:  None  GI Symptoms: None  Oral Problems: None       Anthropometrics:  Height: 157.5 cm (5' 2\")   Weight: (!) 34.4 kg (75 lb 13.4 oz) (from [facility])   BMI (Calculated): 13.87  IBW/kg (Dietitian Calculated): 50 kg          Weight History:   Wt Readings from Last 10 Encounters:   09/26/24 (!) 34.4 kg (75 lb 13.4 oz)   09/26/24 (!) 34.4 kg (75 lb 13.4 oz)   04/15/24 (!) 44.5 kg (98 lb)   02/13/24 (!) 44.9 kg (99 lb)   01/10/24 45.2 kg (99 lb 11.2 oz)   11/06/23 (!) 44.8 kg (98 lb 12.6 oz)   10/03/23 (!) 43.3 kg (95 lb 7.4 oz)   12/15/22 47.5 kg (104 lb 11.5 oz)       Weight Change %:  Weight History / % Weight Change: Pt reports usual body wt of 112lb. Pt does not think she is currently 75lb. Question accuracy of current wt. If current wt is accurate has 23% significant wt loss x5 months.    Nutrition Focused Physical Exam Findings:    Subcutaneous Fat Loss:   Orbital Fat Pads: Severe (dark circles, hollowing and loose skin)  Buccal Fat Pads: Severe (hollow, sunken and narrow face)  Triceps: Severe (negligible fat tissue)  Muscle Wasting:  Temporalis: Severe (hollowed scooping depression)  Pectoralis (Clavicular Region): Severe (protruding prominent clavicle)  Deltoid/Trapezius: Severe (squared shoulders, acromion " process prominent)  Interosseous: Severe (depressed area between thumb and forefinger)  Edema:  Edema: none  Physical Findings:  Hair: Negative  Eyes: Negative  Mouth: Negative  Nails: Negative  Skin: Positive (L eye wound)    Nutrition Significant Labs:  CBC Trend:   Results from last 7 days   Lab Units 09/27/24  0635 09/26/24  0750   WBC AUTO x10*3/uL 15.1* 11.1   RBC AUTO x10*6/uL 3.95* 3.94*   HEMOGLOBIN g/dL 9.5* 9.4*   HEMATOCRIT % 32.4* 33.0*   MCV fL 82 84   PLATELETS AUTO x10*3/uL 83* 70*    , BMP Trend:   Results from last 7 days   Lab Units 09/27/24  0635 09/26/24  0750   GLUCOSE mg/dL 66* 76   CALCIUM mg/dL 8.7 8.3*   SODIUM mmol/L 139 136   POTASSIUM mmol/L 4.1 4.7   CO2 mmol/L 32 29   CHLORIDE mmol/L 101 100   BUN mg/dL 12 18   CREATININE mg/dL 0.62 0.49*    , BG POCT trend:    , Renal Lab Trend:   Results from last 7 days   Lab Units 09/27/24  0635 09/26/24  0750   POTASSIUM mmol/L 4.1 4.7   PHOSPHORUS mg/dL 3.8  --    SODIUM mmol/L 139 136   EGFR mL/min/1.73m*2 83 88   BUN mg/dL 12 18   CREATININE mg/dL 0.62 0.49*        Nutrition Specific Medications:  Scheduled medications  acetaminophen, 975 mg, oral, q8h  atropine, 1 drop, Left Eye, Daily  escitalopram, 10 mg, oral, Daily  levoFLOXacin, 500 mg, oral, q24h KYLEE  moxifloxacin, 1 drop, Left Eye, 4x daily  polyethylene glycol, 17 g, oral, Daily  prednisoLONE acetate, 1 drop, Left Eye, 4x daily  sennosides-docusate sodium, 2 tablet, oral, BID      Continuous medications     PRN medications       I/O:   No BM documented since admission ;      Dietary Orders (From admission, onward)       Start     Ordered    09/26/24 2028  Adult diet Regular  Diet effective now        Question:  Diet type  Answer:  Regular    09/26/24 2030                     Estimated Needs:   Total Energy Estimated Needs (kCal):  (3933-7030)  Method for Estimating Needs: 25kcal/kg x 50 kg  Total Protein Estimated Needs (g):  (50-60)  Method for Estimating Needs: 1.0-1.2g/kg x 50  kg  Total Fluid Estimated Needs (mL):  (1mL/kcal or per team)           Nutrition Diagnosis   Malnutrition Diagnosis  Patient has Malnutrition Diagnosis: Yes  Diagnosis Status: New  Malnutrition Diagnosis: Severe malnutrition related to chronic disease or condition  As Evidenced by: suspect patient meeting <75% of estimated energy needs for >1 month; severe muscle and subcutaneous fat loss.            Nutrition Interventions/Recommendations         Nutrition Prescription:  Boost VHC BID (530kcal, 22g protein each).  Continue regular diet.  Diet texture and consistency per SLP.        Nutrition Interventions:   Interventions: Meals and snacks, Medical food supplement  Goal: consume >50% of meals  Medical Food Supplement: Commercial beverage  Goal: consume 100% ONS       Nutrition Education:   Encouraged PO intake of oral nutrition supplements.       Nutrition Monitoring and Evaluation   Food/Nutrient Related History Monitoring  Monitoring and Evaluation Plan: Energy intake, Amount of food  Criteria: meet >75% of estimated energy needs  Amount of Food: Medical food intake  Criteria: consume 100% ONS    Body Composition/Growth/Weight History  Monitoring and Evaluation Plan: Weight  Criteria: stable weight    Biochemical Data, Medical Tests and Procedures  Monitoring and Evaluation Plan: Electrolyte/renal panel, Glucose/endocrine profile  Criteria: WNL              Time Spent (min): 60 minutes

## 2024-09-27 NOTE — PROGRESS NOTES
Occupational Therapy    Evaluation and Treatment    Patient Name: Patrica Mancera  MRN: [id number]  Today's Date: 9/27/2024  Room: [address] [place] [address]  Time Calculation  Start Time: 0839  Stop Time: 0907  Time Calculation (min): 28 min    Assessment  IP OT Assessment  OT Assessment: Pt presents with decreased cognition as well as weakness impacting ADL and functional mobility independence. Pt would benefit from MOD intensity OT.  Prognosis: Good  Evaluation/Treatment Tolerance: Patient tolerated treatment well  Medical Staff Made Aware: Yes  End of Session Communication: Bedside nurse  End of Session Patient Position: Bed, 3 rail up, Alarm on    Plan:  Inpatient Plan  Treatment Interventions: ADL retraining, Visual perceptual retraining, Functional transfer training, UE strengthening/ROM, Endurance training, Cognitive reorientation, Patient/family training, Equipment evaluation/education, Fine motor coordination activities, Compensatory technique education  OT Frequency: 3 times per week  OT Discharge Recommendations: Moderate intensity level of continued care  Equipment Recommended upon Discharge:  (sock aid, reacher, walker)  OT Recommended Transfer Status: Assist of 1  OT - OK to Discharge: Yes (OT eval complete and d/c recs made)  OT Assessment  OT Assessment Results: Decreased ADL status, Decreased upper extremity strength, Decreased safe judgment during ADL, Decreased cognition, Decreased endurance, Decreased fine motor control, Decreased functional mobility, Decreased IADLs, Visual deficit  Prognosis: Good  Evaluation/Treatment Tolerance: Patient tolerated treatment well  Medical Staff Made Aware: Yes  Strengths: Attitude of self    Subjective   Current Problem:  1. Fall, initial encounter        2. Rupture of globe of eye following blunt trauma, left, initial encounter  levoFLOXacin (Levaquin) 500 mg tablet    atropine 1 % ophthalmic solution    moxifloxacin (Vigamox) 0.5 % ophthalmic solution    prednisoLONE acetate  "(Pred-Forte) 1 % ophthalmic suspension    DISCONTINUED: prednisoLONE acetate (Pred-Forte) 1 % ophthalmic suspension    DISCONTINUED: moxifloxacin (Vigamox) 0.5 % ophthalmic solution    DISCONTINUED: atropine 1 % ophthalmic solution      3. Ruptured globe, left eye, initial encounter  Case Request Operating Room: Exploration and Repair Ruptured Globe    Case Request Operating Room: Exploration and Repair Ruptured Globe        General:  Reason for Referral: s/p fall, L eye trauma consistent with ruptured globe, left eye  Past Medical History Relevant to Rehab: dementia, HTN, asthma, GERD, Anemia, HFPEF, thrombocytopenia, TIA, polycythemia vera, COPD, NSVT  Co-Treatment: PT  Co-Treatment Reason: to maximze pt safety and functional mobility  Prior to Session Communication: Bedside nurse  Patient Position Received: Bed, 3 rail up, Alarm on (sitter in room)  Family/Caregiver Present: Yes  Caregiver Feedback: sitter in room  General Comment: Pt agreeable to therapy this date. Pt performed functional mobility min household distances as well as UB dressing ADLs. Pt with decreased command following and confusion. Pt would benefit from MOD intensity OT.     Precautions:  Hearing/Visual Limitations: L eye trauma, ruptured globe  Medical Precautions: Fall precautions    Pain:  Pain Assessment  Pain Assessment: 0-10  0-10 (Numeric) Pain Score: 0 - No pain    Objective   Cognition:  Overall Cognitive Status: Impaired  Orientation Level: Disoriented to time (oriented to self, place, and birthday (but corrected self for bday, intiially said \"13th\"); when given choices for month, pt stated \"october\"; pt stated \"1990\" for year)  Following Commands:  (decreased command following, requires increased time, repetition, and intermittent tactile cuing - followed ~70% of the time)  Problem Solving:  (assisance required for problem solving)  Insight: Moderate  Impulsive: Mildly  Processing Speed: Delayed    Home Living:  Type of Home: " House  Lives With:  (difficult to assess- stated alone, with brother, and with son during session)  Home Adaptive Equipment: Walker rolling or standard  Home Access:  (11 then 2 steps to enter)  Bathroom Shower/Tub: Tub/shower unit  Bathroom Equipment:  (+shower hcair (but does not fit), +grab bars)  Home Living Comments: Pt questionable historian, difficult to retrieve PLOF info     Prior Function:  Level of [place]:  (Endorsed independence with ADLs/IADLs with walker as well as help from brother and son - stated son puts on her socks)  Receives Help From: Family    IADL History:  IADL Comments: brother takes her places in community    ADL:  Eating Assistance:  (anticipate VCs needed and set up for physical assist)  Grooming Assistance:  (modA + VCs, anticipated)  Bathing Assistance:  (MaxA + VCs, anticipated)  UE Dressing Assistance:  (While seated EOB, pt doffed gown with maxA and donned gown with modA, max VCs and mod tactile cues throughout for sequencing)  LE Dressing Assistance:  (When asked pt if she could take off and put on her socks, she stated her brother does this for her. Anticipate maxA)  Toileting Assistance with Device:  (maxA, anticipated)  Functional Assistance:  (maxA, anticipated)    Activity Tolerance:  Endurance: Tolerates 10 - 20 min exercise with multiple rests    Balance:  Dynamic Sitting Balance  Dynamic Sitting-Level of Assistance: Contact guard  Dynamic Standing Balance  Dynamic Standing-Level of Assistance: Minimum assistance (+walker)  Static Sitting Balance  Static Sitting-Level of Assistance: Contact guard  Static Standing Balance  Static Standing-Level of Assistance: Minimum assistance (+walker)    Bed Mobility/Transfers: Bed Mobility/Transfers: Bed Mobility  Bed Mobility: Yes  Bed Mobility 1  Bed Mobility 1: Supine to sitting, Sitting to supine  Level of Assistance 1: Contact guard, Minimal verbal cues  Bed Mobility 2  Bed Mobility  2: Rolling right, Rolling left  Level of  Assistance 2: Minimum assistance, Moderate verbal cues  Bed Mobility Comments 2: VCs for reaching rails  Functional Mobility  Functional Mobility Performed: Yes  Functional Mobility 1  Device 1: Rolling walker  Assistance 1: Minimum assistance  Comments 1: With walker and [place], pt performed functional mobility MIN household distances (few feet forward from bed and back)   and Transfers  Transfer: Yes  Transfer 1  Transfer From 1: Bed to  Transfer to 1: Stand  Transfer Device 1: Walker  Transfer Level of Assistance 1: Minimum assistance, Minimal verbal cues, Minimal tactile cues  Trials/Comments 1: VCs + tactile cues for hand placement  Transfers 2  Transfer From 2: Stand to  Transfer to 2: Bed  Transfer Device 2: Walker  Transfer Level of Assistance 2: Minimum assistance, Minimal verbal cues, Minimal tactile cues  Trials/Comments 2: VCs + tacitle cues for hand placement    IADL's:   IADL Comments: brother takes her places in community    Sensation:  Sensation Comment: pt did not understand question regarding n/t    Coordination:  Movements are Fluid and Coordinated: No     Hand Function:  Hand Function  Gross Grasp: Functional  Coordination: Impaired    Extremities:   RUE   RUE :  (~4-/5 throughout), LUE   LUE:  (~4-/5 throughout),  , and      Outcome Measures: [facility] Daily Activity  Putting on and taking off regular lower body clothing: A lot  Bathing (including washing, rinsing, drying): A lot  Putting on and taking off regular upper body clothing: A lot  Toileting, which includes using toilet, bedpan or urinal: A lot  Taking care of personal grooming such as brushing teeth: A lot  Eating Meals: A lot  Daily Activity - Total Score: 12  Brief Confusion Assessment Method (bCAM)  CAM Result:  (pt not oriented to date, appears to be disoriented at baseline)      ,          Education Documentation  Body Mechanics, taught by Leydi Gamboa OT at 9/27/2024 11:27 AM.  Learner: Patient  Readiness: Eager  Method:  Explanation, Demonstration  Response: Needs Reinforcement    Precautions, taught by Leydi Gamboa OT at 9/27/2024 11:27 AM.  Learner: Patient  Readiness: Eager  Method: Explanation, Demonstration  Response: Needs Reinforcement    ADL Training, taught by Leydi Gamboa OT at 9/27/2024 11:27 AM.  Learner: Patient  Readiness: Eager  Method: Explanation, Demonstration  Response: Needs Reinforcement    Education Comments  No comments found.      Goals:   Encounter Problems       Encounter Problems (Active)       ADLs       Patient will complete upper body dressing with stand by assist level of assistance donning and doffing all UE clothes with PRN adaptive equipment while edge of bed  (Progressing)       Start:  09/27/24    Expected End:  10/11/24            Patient will complete lower body dressing with minimal assist  level of assistance donning and doffing all LE clothes  with PRN adaptive equipment while edge of bed  (Progressing)       Start:  09/27/24    Expected End:  10/11/24            Patient will complete toileting including hygiene clothing management/hygiene with minimal assist  level of assistance and raised toilet seat and grab bars. (Progressing)       Start:  09/27/24    Expected End:  10/11/24               COGNITION/SAFETY       With supervision, patient will follow 90% simple commands to allow improved ADL performance. (Progressing)       Start:  09/27/24    Expected End:  10/11/24            Patient will demonstrated orientation x 4 with verbal cues. (Progressing)       Start:  09/27/24    Expected End:  10/11/24       ORIENTATION            MOBILITY       Patient will perform Functional mobility max Household distances/Community Distances with contact guard assist level of assistance and least restrictive device in order to improve safety and functional mobility. (Progressing)       Start:  09/27/24    Expected End:  10/11/24               TRANSFERS       Patient will perform bed mobility stand by  assist level of assistance and bed rails in order to improve safety and independence with mobility (Progressing)       Start:  09/27/24    Expected End:  10/11/24            Patient will complete sit to stand transfer with contact guard assist level of assistance and least restrictive device in order to improve safety and prepare for out of bed mobility. (Progressing)       Start:  09/27/24    Expected End:  10/11/24                   Treatment Completed on Evaluation  Activities of Daily Living:    UE Dressing  UE Dressing Comments: see eval section    Therapy/Activity:     Therapeutic Activity  Therapeutic Activity Performed: Yes  Therapeutic Activity 1: skilled VCs for sequencing ADL tasks (UB dressing)  Therapeutic Activity 2: skilled VCs and tactile cuing for walker management       09/27/24 at 11:31 AM   ASHLEY Morrison/NATASHA  Rehab Office: [phone]

## 2024-09-27 NOTE — PROGRESS NOTES
Vancomycin Dosing by Pharmacy- FOLLOW UP    Patrica Mancera is a 89 y.o. year old female who Pharmacy has been consulted for vancomycin dosing for other opthal . Based on the patient's indication and renal status this patient is being dosed based on a goal AUC of 400-600.     Renal function is currently stable.    Current vancomycin dose: 1000 mg given every 12 hours    Estimated vancomycin AUC on current dose: 875 mg/L.hr     Visit Vitals  /65   Pulse 74   Temp 36.3 °C (97.3 °F)   Resp 17        Lab Results   Component Value Date    CREATININE 0.62 2024    CREATININE 0.49 (L) 2024    CREATININE 0.66 2024    CREATININE 0.62 2023        Patient weight is as follows:   Vitals:    24 0634   Weight: (!) 34.4 kg (75 lb 13.4 oz)       Cultures:  No results found for the encounter in last 14 days.       I/O last 3 completed shifts:  In: 750 (21.8 mL/kg) [I.V.:200 (5.8 mL/kg); IV Piggyback:550]  Out: - (0 mL/kg)   Weight: 34.4 kg   I/O during current shift:  I/O this shift:  In: -   Out: 600 [Urine:600]    Temp (24hrs), Av.4 °C (97.5 °F), Min:36.1 °C (97 °F), Max:36.9 °C (98.4 °F)      Assessment/Plan    Above goal AUC. Orders placed for new vancomcyin regimen of 1000 every 24 hours to begin at 0900.    This dosing regimen is predicted by InsightRx to result in the following pharmacokinetic parameters:  Loading dose: N/A  Regimen: 1000 mg IV every 24 hours.  Start time: 08:57 on 2024  Exposure target: AUC24 (range)400-600 mg/L.hr   SAP70-31: 485 mg/L.hr  AUC24,ss: 570 mg/L.hr  Probability of AUC24 > 400: 99 %  Ctrough,ss: 16.9 mg/L  Probability of Ctrough,ss > 20: 23 %      The next level will be obtained on  at 0500. May be obtained sooner if clinically indicated.   Will continue to monitor renal function daily while on vancomycin and order serum creatinine at least every 48 hours if not already ordered.  Follow for continued vancomycin needs, clinical response, and  signs/symptoms of toxicity.       Malathi Ruiz, PharmD   [facility] Pharmacist

## 2024-09-27 NOTE — PROGRESS NOTES
09/27/24 1500   Discharge Planning   Home or Post Acute Services In home services   Type of Home Care Services Home nursing visits;Home health aide;Home OT;Home PT   Expected Discharge Disposition Home H   Does the patient need discharge transport arranged? Yes   RoundTrip coordination needed? Yes   Has discharge transport been arranged? Yes   What day is the transport expected? 09/27/24   What time is the transport expected? 1600     Patient to discharge home today. Transport arranged via [facility] stretcher for 4pm. Patient, son MD Ernie, Ann Marie STEVE aware. Blue slip delivered to unit secretary. Referral placed to [facility] and pending start of care date.   Update 1600: Received update from [facility] who confirmed start of care 9/30. MD and son updated and in agreement.    Geri BROWNE, RN  Transitional Care Coordinator (TCC)  [phone]

## 2024-09-27 NOTE — PROGRESS NOTES
Physical Therapy    Physical Therapy Evaluation & Treatment    Patient Name: Patrica Mancera  MRN: [id number]  Department: [facility] 3  Room: [address] [place], [place] [address]  Today's Date: 9/27/2024   Time Calculation  Start Time: 0839  Stop Time: 0907  Time Calculation (min): 28 min    Assessment/Plan   PT Assessment  PT Assessment Results: Decreased strength, Decreased endurance, Impaired balance, Decreased mobility, Decreased cognition, Decreased safety awareness  Rehab Prognosis: Good  Evaluation/Treatment Tolerance: Patient tolerated treatment well, Patient limited by fatigue  Medical Staff Made Aware: Yes  Strengths: Attitude of self  End of Session Communication: Bedside nurse  Assessment Comment: Pt with good participation in session. Pt was able to ambulate 6ft x 2 with min A and WW. Pt required max cueing for safety and AD use. Patient would benefit from skilled physical therapy to maximize functional mobility and safety. Pt is appropriate for mod intensity therapy when medically appropriate for DC.  End of Session Patient Position: Bed, 3 rail up, Alarm on (sitter present, B mitts on, LUE restrained to bed)   IP OR SWING BED PT PLAN  Inpatient or Swing Bed: Inpatient  PT Plan  Treatment/Interventions: Bed mobility, Transfer training, Gait training, Balance training, Strengthening, Endurance training, Therapeutic activity, Therapeutic exercise  PT Plan: Ongoing PT  PT Frequency: 3 times per week  PT Discharge Recommendations: Moderate intensity level of continued care  Equipment Recommended upon Discharge:  (TBD)  PT Recommended Transfer Status: Assist x2, Assistive device (WW)  PT - OK to Discharge: Yes (meaning pt seen and dc rec made)  RN cleared prior to session    Subjective   General Visit Information:  General  Reason for Referral: s/p fall, L eye trauma consistent with ruptured globe, left eye  Past Medical History Relevant to Rehab: dementia, HTN, asthma, GERD, Anemia, HFPEF, thrombocytopenia, TIA, polycythemia vera, COPD,  "NSVT  Family/Caregiver Present: Yes  Caregiver Feedback: sitter in room  Co-Treatment: OT  Co-Treatment Reason: to maximze pt safety and functional mobility  Prior to Session Communication: Bedside nurse  Patient Position Received: Bed, 3 rail up, Alarm on (sitter present, B mitts on, LUE restrained to bed)  Preferred Learning Style: auditory, verbal  General Comment: Pt pleasant and agreeable to therapy  Home Living:  Home Living  Type of Home: House  Lives With:  (difficult to assess- stated alone, with brother, and with son during session)  Home Adaptive Equipment: Walker rolling or standard  Home Access: Stairs to enter with rails  Entrance Stairs-Rails: Right  Entrance Stairs-Number of Steps: 11 then 2 steps to enter  Bathroom Shower/Tub: Tub/shower unit  Bathroom Equipment:  (+shower hcair (but does not fit), +grab bars)  Home Living Comments: Pt questionable historian, difficult to retrieve PLOF info  Prior Level of Function:  Prior Function Per Pt/Caregiver Report  Level of [place]:  (difficult to determine - pt stating she was IND in all ADLs and IADLs but then stating her brother and son assist her)  Receives Help From: Family  Ambulatory Assistance: Independent  Prior Function Comments: difficult to determine pt is a questionable historian - pt stating she was IND in all ADLs and IADLs but then stating her brother and son assist her. Pt states she ambulates with WW. Pt endorses no previous falls  Precautions:  Precautions  Hearing/Visual Limitations: L eye trauma, ruptured globe; pt with eye patch covering L eye  Medical Precautions: Fall precautions    Objective   Pain:  Pain Assessment  Pain Assessment: 0-10  0-10 (Numeric) Pain Score: 0 - No pain  Cognition:  Cognition  Overall Cognitive Status: Impaired  Arousal/Alertness: Appropriate responses to stimuli  Orientation Level: Disoriented to time (oriented to self, place, and birthday (but corrected self for bday, intiially said \"13th\"); when given " "choices for month, pt stated \"october\"; pt stated \"1990\" for year)  Following Commands:  (decreased command following, requires increased time, repetition, and intermittent tactile cuing - followed ~70% of the time)    General Assessments:  Activity Tolerance  Endurance: Tolerates 10 - 20 min exercise with multiple rests    Sensation  Light Touch: No apparent deficits    Strength  Strength Comments: BLE grossly >3+/5  Coordination  Movements are Fluid and Coordinated: No    Postural Control  Postural Control: Within Functional Limits    Static Sitting Balance  Static Sitting-Balance Support: Bilateral upper extremity supported, Feet supported  Static Sitting-Level of Assistance: Contact guard    Static Standing Balance  Static Standing-Balance Support: Bilateral upper extremity supported  Static Standing-Level of Assistance: Minimum assistance  Static Standing-Comment/Number of Minutes: WW  Functional Assessments:  Bed Mobility  Bed Mobility: Yes  Bed Mobility 1  Bed Mobility 1: Supine to sitting, Sitting to supine  Level of Assistance 1: Contact guard, Minimal verbal cues  Bed Mobility Comments 1: min vc for sequencing and hand placement  Bed Mobility 2  Bed Mobility  2: Rolling right, Rolling left  Level of Assistance 2: Minimum assistance, Moderate verbal cues  Bed Mobility Comments 2: mod vc for hand placement and sequencing    Transfers  Transfer: Yes  Transfer 1  Transfer From 1: Sit to, Stand to  Transfer to 1: Stand, Sit  Technique 1: Sit to stand, Stand to sit  Transfer Device 1: Walker  Transfer Level of Assistance 1: Minimum assistance, Moderate verbal cues  Trials/Comments 1: 1 trial; mod vc for hand placement, sequencing, safety, and posture    Ambulation/Gait Training  Ambulation/Gait Training Performed: Yes  Ambulation/Gait Training 1  Surface 1: Level tile  Device 1: Rolling walker  Assistance 1: Minimum assistance, Maximum verbal cues  Quality of Gait 1: Narrow base of support, Inconsistent stride " length, Shuffling gait, Decreased step length, Diminished heel strike (decreased foot clearance and chuck, improper distancing from WW, assist managing WW)  Comments/Distance (ft) 1: 6ft x 2; max vc for AD use and distancing, safety, posture, and sequencing  Extremity/Trunk Assessments:  RLE   RLE :  (grossly >3+/5)  LLE   LLE :  (grossly >3+/5)  Treatments:  Therapeutic Activity  Therapeutic Activity Performed: Yes  Therapeutic Activity 1: increased time educating pt of=n safety with mobility and WW management  Therapeutic Activity 2: static/dynamic standing with WW pre gait working on walker use and posture with min A    Ambulation/Gait Training  Ambulation/Gait Training Performed: Yes  Ambulation/Gait Training 1  Surface 1: Level tile  Device 1: Rolling walker  Assistance 1: Minimum assistance, Maximum verbal cues  Quality of Gait 1: Narrow base of support, Inconsistent stride length, Shuffling gait, Decreased step length, Diminished heel strike (decreased foot clearance and chuck, improper distancing from WW, assist managing WW)  Comments/Distance (ft) 1: 6ft x 2; max vc for AD use and distancing, safety, posture, and sequencing; pt required increased time to perform  Outcome Measures:  [facility] Basic Mobility  Turning from your back to your side while in a flat bed without using bedrails: A little  Moving from lying on your back to sitting on the side of a flat bed without using bedrails: A little  Moving to and from bed to chair (including a wheelchair): A little  Standing up from a chair using your arms (e.g. wheelchair or bedside chair): A little  To walk in hospital room: A lot  Climbing 3-5 steps with railing: Total  Basic Mobility - Total Score: 15    Encounter Problems       Encounter Problems (Active)       PT Problem       Patient will ambulate >80' with LRAD and Supervision        Start:  09/27/24    Expected End:  10/11/24            Patient will complete supine to sit and sit to supine Independent         Start:  09/27/24    Expected End:  10/11/24            Patient will perform sit<>stand transfer with LRAD, and Supervision        Start:  09/27/24    Expected End:  10/11/24            Pt will demo static/dynamic standing x 2 min with 0 LOB, supervision, and WW       Start:  09/27/24    Expected End:  10/11/24               Pain - Adult              Education Documentation  Precautions, taught by Radha Cardenas, PT at 9/27/2024 11:51 AM.  Learner: Patient  Readiness: Acceptance  Method: Explanation, Demonstration  Response: Needs Reinforcement    Body Mechanics, taught by Radha Cardenas, PT at 9/27/2024 11:51 AM.  Learner: Patient  Readiness: Acceptance  Method: Explanation, Demonstration  Response: Needs Reinforcement    Mobility Training, taught by Radha Cardenas PT at 9/27/2024 11:51 AM.  Learner: Patient  Readiness: Acceptance  Method: Explanation, Demonstration  Response: Needs Reinforcement    Education Comments  No comments found.

## 2024-09-27 NOTE — HH CARE COORDINATION
Home Care received a Referral for Nursing, Physical Therapy, Occupational Therapy, and Home Health Aide. We have processed the referral for a Start of Care on 9/30/24.     If you have any questions or concerns, please feel free to contact us at [phone]. Follow the prompts, enter your five digit zip code, and you will be directed to your care team on [facility].

## 2024-09-27 NOTE — PROGRESS NOTES
09/27/24 1120   Discharge Planning   Living Arrangements Children  (Home with son.)   Support Systems Children   Assistance Needed Pt will need resumed home care services for HHAs.   Type of Residence Private residence   Do you have animals or pets at home? No   Who is requesting discharge planning? Other (Comment)  (Son)   Home or Post Acute Services In home services   Type of Home Care Services Home health aide   Does the patient need discharge transport arranged? Yes  (Pt will need transport via stretcher.)   RoundTrip coordination needed? Yes   Financial Resource Strain   How hard is it for you to pay for the very basics like food, housing, medical care, and heating? Not hard   Housing Stability   In the last 12 months, was there a time when you were not able to pay the mortgage or rent on time? N   At any time in the past 12 months, were you homeless or living in a shelter (including now)? N   Transportation Needs   In the past 12 months, has lack of transportation kept you from medical appointments or from getting medications? no   In the past 12 months, has lack of transportation kept you from meetings, work, or from getting things needed for daily living? No   Patient Choice   Patient / Family choosing to utilize agency / facility established prior to hospitalization Yes     Assessment Note:  Attempted to meet with pt (h/o dementia), so contacted pt's son Ernie Rea ([phone]) via phone, and introduced myself as care coordinator and member of the Care Transitions team for discharge planning.   Pt requires assistance with bathing, toileting (incontinent), and transferring (pt is able to pivot).  Pt is able to feed herself.  Pt has not had any recent appointments outside of the home.  Pt's address, phone number and contact information was verified, information updated.  PT and OT evaluations were ordered.  Pt's son would like home PT/OT; preference for home care agency is [facility].  Pt will also require  "transportation home via stretcher (there is 1 step to enter home, pt's bedroom is on the 2nd floor). Son does not have any other questions/concerns at this time.     Previous Home Care: Pt has HHAs 4 days/week for 5 hrs/day.  Pt's son cannot recall the name of the home care agency.  DME: Rollator, shower chair, nebulizer (not using), BSC, chair lift, incontinence supplies via mail (son cannot recall the name of the DME company).  Son is requesting a hospital bed. Per son, pt had one many years ago but it was returned.  Pharmacy: [facility] at [address] in [place].   Falls: Pt \"rolled out of bed\", hit head.  PCP:   [facility] CNP Mary Jo Bartlett (last visit was 2-3 wks ago).    Erin Cordero MSN, RN-BC  Transitional Care Coordinator (TCC)  [phone]   "

## 2024-09-27 NOTE — CARE PLAN
The patient's goals for the shift include  remain free from falls/injury.    The clinical goals for the shift include pts pain will be controlled through shift    Over the shift, the patient did not make progress toward the following goals. Barriers to progression include pt received evening eye drops. Recommendations to address these barriers include pt. Able to go home .

## 2024-10-04 ENCOUNTER — HOME CARE VISIT (OUTPATIENT)
Dept: HOME HEALTH SERVICES | Facility: HOME HEALTH | Age: 89
End: 2024-10-04

## 2024-10-04 ENCOUNTER — HOME CARE VISIT (OUTPATIENT)
Dept: HOME HEALTH SERVICES | Facility: HOME HEALTH | Age: 89
End: 2024-10-04
Payer: MEDICARE

## 2024-10-04 ENCOUNTER — OFFICE VISIT (OUTPATIENT)
Dept: GERIATRIC MEDICINE | Facility: CLINIC | Age: 89
End: 2024-10-04
Payer: MEDICARE

## 2024-10-04 VITALS
DIASTOLIC BLOOD PRESSURE: 70 MMHG | RESPIRATION RATE: 18 BRPM | OXYGEN SATURATION: 95 % | WEIGHT: 110 LBS | SYSTOLIC BLOOD PRESSURE: 110 MMHG | HEART RATE: 65 BPM | HEIGHT: 65 IN | TEMPERATURE: 98 F | BODY MASS INDEX: 18.33 KG/M2

## 2024-10-04 DIAGNOSIS — Z23 INFLUENZA VACCINE ADMINISTERED: ICD-10-CM

## 2024-10-04 DIAGNOSIS — R52 ACUTE PAIN: ICD-10-CM

## 2024-10-04 DIAGNOSIS — F03.90 MAJOR NEUROCOGNITIVE DISORDER (MULTI): ICD-10-CM

## 2024-10-04 DIAGNOSIS — D69.6 THROMBOCYTOPENIA (CMS-HCC): ICD-10-CM

## 2024-10-04 DIAGNOSIS — S05.32XS RUPTURE OF GLOBE OF EYE FOLLOWING BLUNT TRAUMA, LEFT, SEQUELA: Primary | ICD-10-CM
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
## 2024-10-04 PROCEDURE — G0299 HHS/HOSPICE OF RN EA 15 MIN: HCPCS | Mod: HHH

## 2024-10-04 PROCEDURE — 90662 IIV NO PRSV INCREASED AG IM: CPT | Performed by: NURSE PRACTITIONER

## 2024-10-04 PROCEDURE — 169592 NO-PAY CLAIM PROCEDURE

## 2024-10-04 ASSESSMENT — ENCOUNTER SYMPTOMS
OCCASIONAL FEELINGS OF UNSTEADINESS: 1
FORGETFULNESS: 1
CHANGE IN APPETITE: VARYING
DEPRESSION: 0
DESCRIPTION OF MEMORY LOSS: SHORT TERM
LAST BOWEL MOVEMENT: 67117
PERSON REPORTING PAIN: PATIENT
APPETITE LEVEL: FAIR
FATIGUE: 1
BLURRED VISION: 1
DENIES PAIN: 1

## 2024-10-04 ASSESSMENT — PAIN SCALES - GENERAL: PAINLEVEL: 4

## 2024-10-04 ASSESSMENT — ACTIVITIES OF DAILY LIVING (ADL)
ENTERING_EXITING_HOME: NEEDS ASSISTANCE
OASIS_M1830: 06

## 2024-10-05 VITALS
TEMPERATURE: 97.6 F | SYSTOLIC BLOOD PRESSURE: 110 MMHG | DIASTOLIC BLOOD PRESSURE: 60 MMHG | RESPIRATION RATE: 20 BRPM | HEART RATE: 86 BPM

## 2024-10-05 PROBLEM — R52 ACUTE PAIN: Status: ACTIVE | Noted: 2024-10-05
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
## 2024-10-05 PROBLEM — Z23 INFLUENZA VACCINE ADMINISTERED: Status: ACTIVE | Noted: 2024-10-05

## 2024-10-05 PROBLEM — S05.32XS: Status: ACTIVE | Noted: 2024-09-26

## 2024-10-05 RX ORDER — ACETAMINOPHEN 500 MG
1000 TABLET ORAL 3 TIMES DAILY PRN
COMMUNITY

## 2024-10-05 NOTE — PATIENT INSTRUCTIONS
Dear Mrs. Mancera,    It was a pleasure seeing you today.    I was so sorry to hear of the accident you had.     Please continue to take Tylenol  mg -2 tablets three times a day at least 6 hours apart.  You should take is scheduled for the next couple of weeks to help control the pain.    I will order a hospital bed.     As Reynold and I discussed, your insurance does not cover wipes , body wash or hand soap per [facility]     I will follow up with you in 4-6 weeks.    Sincerely,    Mary Jo Bartlett, MSN, APRN-BC

## 2024-10-05 NOTE — PROGRESS NOTES
Some elements may have been copied from prior note(s). The elements have been updated and reflect current evaluation, examination and decision making from today.           Reason for visit:  Follow up s/p eye surgery, Face to Face Examination for Hospital Bed , requested flu vaccine,  and management of chronic active illnesses.       Summary Statement: Mrs. Mancera is a 89 yo elderly woman with a PMH significant for rupture of globe of left eye following trauma s/p surgical repair (fall from bed)- 9/2024,   COPD/asthma, HFpEF (60%), HTN, Chronic compression fracture T6, polycythemia vera,  subdural hematoma (2019),  TIA, Dementia, hallucinations,  COVID 19 virus  with acute hypoxic respiratory  failure, (11/2022)  chronic leukocytosis, anemia, Lymphadenopathy concerning for lymphoma , Splenomegaly, PCV JAK2 V617F+,  UTI, weight loss,  polycythemia vera, and impaired gait     PSH:  hysterectomy ('60's) and cataract surgery     Reason for homebound status: leaving the home requires considerable taxing effort due impaired gait and the assistance of another person due to cognitive impairment            HPI: Mrs. Mancera is being seen today at home in the presence of her son, Reynold who is her primary caregiver  Reynold states things had just started to get better in terms of patient sleeping better/hallucinations    Unfortunately, patient fell out of bed ans sustained a ruptured of globe of left eye requiring surgery.     She endorses some pain at the time of visit.  Looks uncomfortable with facial grimacing.     Contrary to discharge summary note, [facility] had NOT ordered a hospital bed.  Son was asked to have hospital order this as it is usually    Patient endorses pain laying flat.  Needs to elevate HOB to control ocular pressure  control pain, and reduce swelling.    Face to Face Examination will be performed today:    Face to Face Examination for Hospital Bed      1. Patient has a medical condition recent ruptured of  the left globe s/p repair related a fall from a regular bed, that requires positioning that an ordinary bed can't accommodate.       2. Patient requires body positioning in ways an ordinary bed can't accommodate to alleviate pain.       3. Patient requires head of the bed to be elevated greater than 30 degrees most of the time to minimize swelling and elevated pain.  Pillows have been ineffective.      4. Patient needs a bed height different than the ordinary bed to permit transfers to  a standing position.       5. Patient has a frequent need to change body position.       MEDS IN THE HOME VERIFIED TODAY     atropine 1 % ophthalmic solution; Administer 1 drop into the left eye   once daily.; Start taking on: September 28, 2024   levoFLOXacin 500 mg tablet; Commonly known as: Levaquin; Take 1 tablet   (500 mg) by mouth once every 24 hours for 10 days.   moxifloxacin 0.5 % ophthalmic solution; Commonly known as: Vigamox;   Administer 1 drop into the left eye 4 times a day.   prednisoLONE acetate 1 % ophthalmic suspension; Commonly known as:   Pred-Forte; Administer 1 drop into the left eye 4 times a day.   escitalopram 10 mg tablet; Commonly known as: Lexapro; Take 1 tablet (10   mg) by mouth once daily. (STOP taking the 5 mg tablet daily)   pyridoxine 100 mg tablet; Commonly known as: Vitamin B-6    ROS:  Appetite is good  Sleeps fairly well  No diarrhea or constipation  No falls  No dizziness  No fever or chills  Denies chest pain or sob   Hallucinations at night (sundowning)       Physical Exam   General appearance: Alert, cooperative . Sitting upright on side of bed. Looks uncomfortable. + facial grimacing. NAD.   Head and Face   Head and face: Normal.    Eyes   Inspection of eyes: Abnormal: Sclera and conjunctiva were normal to right eye.    Unable to open left eye.  Slight bruising around left orbit.   Wearing eye shield.   Eyelids: unable to assess due to squinting   Pupil exam: PERRLA. Extraocular movements  were intact to right eye.  Able to open left eye due to pain.   Ears, Nose, Mouth, and Throat   Ears: External: Normal  Oropharynx: Normal with moist mucus membranes, tongue midline, no PND, no erythema or enlargement of tonsils.  Hearing: Normal  Nasal mucosa, septum, and turbinates: Normal   Normal without edema or erythema.   Lips, teeth, and gums: abnormal. Poor dentition   Neck   Neck Exam: Appearance of the neck was normal. No neck masses observed. No jugular vein distension.   Pulmonary   Respiratory assessment: No respiratory distress, normal respiratory rhythm and effort.  Palpation of Chest: Normal.    Cardiovascular   Auscultation of heart: Apical pulse normal, heart rate and rhythm normal, normal S1 and S2, no murmurs, no gallops and no pericardial rub.  Examination of extremities for edema and/or varicosities:     Chest   Chest: Symmetrical and normal appearance.   Abdomen   Abdominal Exam: No bruits normal bowel sounds, soft, non-tender, no abdominal masses palpated.   Bowel sounds hypoactive x 4.   Genitourinary   Bladder: deferred   Lymphatic   Palpation of lymph nodes in axillae: Normal.    No cervical lymphadenopathy  No supraclavicular LAD  Musculoskeletal   Inspection/palpation of joints, bones, and muscles:   Range of motion: Abnormal.-slight decreased to left shoulder  Muscle strength/tone: Normal. RUE/LUE 5/5 RLE/LLE 5/5   Gait: steady   Skin   Skin inspection: Skin dry, warm and intact.   Trophic changes to BLE      Neurologic   Cranial nerves: CNII-XII intact  except vision to left eye-unable to assess due to not opening eye     Psychiatric   Judgment and insight: Intact and appropriate   Orientation: Oriented to person and place.   Mood and affect: Normal  Recent and remote memory: Poor LT memory        LABS    Chemistry    Lab Results   Component Value Date/Time     09/27/2024 0635    K 4.1 09/27/2024 0635     09/27/2024 0635    CO2 32 09/27/2024 0635    BUN 12 09/27/2024 0635     CREATININE 0.62 09/27/2024 0635    Lab Results   Component Value Date/Time    CALCIUM 8.7 09/27/2024 0635    ALKPHOS 69 09/26/2024 0750    AST 34 09/26/2024 0750    ALT 15 09/26/2024 0750    BILITOT 0.8 09/26/2024 0750        Lab Results   Component Value Date    WBC 15.1 (H) 09/27/2024    HGB 9.5 (L) 09/27/2024    HCT 32.4 (L) 09/27/2024    MCV 82 09/27/2024    PLT 83 (L) 09/27/2024        ASSESSMENT/PLAN  1. Rupture of globe of eye following blunt trauma, left, sequela/Acute pain/Fall from bed, sequela  -unfortunately fell from  a standard height bed without rails   -s/p surgery  -looks uncomfortable  -States Tylenol ES 1000 mg tid prn  helps with pain   -Contributes to swelling which exacerbates pain   -Son has pillows on bed and her daughter sent a pillow wedge  -Still suboptimal   --Would benefit from a hospital bed as she should not lie flat.- will send script and chart notes to [facility]   -Continue eye drops and antibiotic as prescribed  -Son is administering correctly  -No s/s of infection   -Follow up with Opthalmology as recommended     2. Thrombocytopenia (CMS-HCC)  -discussed with son again today  -states he is aware of abnormal blood work and concern for malignancy  -However, states the MD at that time stated patient would require a bone marrow biopsy  -He nor the MD recommended putting her through this     3. Major neurocognitive disorder  -stable  -Apparently not taking Donepezil  -Will inquire at next visit  -Total care  -Receives A services     4. Influenza vaccine administered  -requested by son and patient agreed  -no contraindications  -received the Fluzone High Dose Vaccine  -Tolerated well  -See immunization record    ++Miscellaneous  At the time of visit , NP called [facility] .  Son made aware that her insurance does not cover  Wipes , body wash or hand soap.     Stable  Routine follow up n 4-6 weeks

## 2024-10-06 ENCOUNTER — APPOINTMENT (OUTPATIENT)
Dept: HOME HEALTH SERVICES | Facility: HOME HEALTH | Age: 89
End: 2024-10-06
Payer: COMMERCIAL

## 2024-10-07 ENCOUNTER — HOME CARE VISIT (OUTPATIENT)
Dept: HOME HEALTH SERVICES | Facility: HOME HEALTH | Age: 89
End: 2024-10-07
Payer: MEDICARE

## 2024-10-08 ENCOUNTER — HOME CARE VISIT (OUTPATIENT)
Dept: HOME HEALTH SERVICES | Facility: HOME HEALTH | Age: 89
End: 2024-10-08
Payer: MEDICARE

## 2024-10-09 ENCOUNTER — HOME CARE VISIT (OUTPATIENT)
Dept: HOME HEALTH SERVICES | Facility: HOME HEALTH | Age: 89
End: 2024-10-09
Payer: MEDICARE

## 2024-10-09 PROCEDURE — G0155 HHCP-SVS OF CSW,EA 15 MIN: HCPCS | Mod: HHH

## 2024-10-09 SDOH — HEALTH STABILITY: MENTAL HEALTH: SOCIAL ISOLATION: 1

## 2024-10-09 SDOH — HEALTH STABILITY: MENTAL HEALTH: STRESS FACTORS COMMENTS: RECENT EYE SURGERY

## 2024-10-09 ASSESSMENT — ENCOUNTER SYMPTOMS: AGITATION: 1

## 2024-10-09 ASSESSMENT — ACTIVITIES OF DAILY LIVING (ADL)
AMBULATION_REQUIRES_ASSISTANCE: 1
SHOPPING_REQUIRES_ASSISTANCE: 1
GROOMING_REQUIRES_ASSISTANCE: 1
DRESSING_REQUIRES_ASSISTANCE: 1
LAUNDRY_REQUIRES_ASSISTANCE: 1
BATHING_REQUIRES_ASSISTANCE: 1

## 2024-10-10 ENCOUNTER — HOME CARE VISIT (OUTPATIENT)
Dept: HOME HEALTH SERVICES | Facility: HOME HEALTH | Age: 89
End: 2024-10-10
Payer: MEDICARE

## 2024-10-10 VITALS — TEMPERATURE: 96.8 F | DIASTOLIC BLOOD PRESSURE: 79 MMHG | HEART RATE: 70 BPM | SYSTOLIC BLOOD PRESSURE: 120 MMHG

## 2024-10-10 VITALS
RESPIRATION RATE: 16 BRPM | DIASTOLIC BLOOD PRESSURE: 65 MMHG | HEART RATE: 80 BPM | TEMPERATURE: 97.4 F | SYSTOLIC BLOOD PRESSURE: 120 MMHG

## 2024-10-10 VITALS — HEART RATE: 91 BPM | OXYGEN SATURATION: 93 %

## 2024-10-10 PROCEDURE — G0151 HHCP-SERV OF PT,EA 15 MIN: HCPCS | Mod: HHH

## 2024-10-10 PROCEDURE — G0152 HHCP-SERV OF OT,EA 15 MIN: HCPCS | Mod: HHH

## 2024-10-10 PROCEDURE — G0300 HHS/HOSPICE OF LPN EA 15 MIN: HCPCS | Mod: HHH

## 2024-10-10 SDOH — HEALTH STABILITY: PHYSICAL HEALTH: EXERCISE TYPE: B LE

## 2024-10-10 SDOH — HEALTH STABILITY: PHYSICAL HEALTH: EXERCISE ACTIVITY: SLR, HS, TKE, HIP FLEX, ANKLE ROM

## 2024-10-10 SDOH — HEALTH STABILITY: PHYSICAL HEALTH: PHYSICAL EXERCISE: 10

## 2024-10-10 SDOH — HEALTH STABILITY: PHYSICAL HEALTH: EXERCISE ACTIVITIES SETS: 2

## 2024-10-10 SDOH — HEALTH STABILITY: PHYSICAL HEALTH: PHYSICAL EXERCISE: SUPINE, SIT

## 2024-10-10 ASSESSMENT — ACTIVITIES OF DAILY LIVING (ADL)
AMBULATION ASSISTANCE ON FLAT SURFACES: 1
AMBULATION_DISTANCE/DURATION_TOLERATED: 10 FT
CURRENT_FUNCTION: MINIMUM ASSIST
PHYSICAL TRANSFERS ASSESSED: 1
MONEY MANAGEMENT (EXPENSES/BILLS): TOTALLY DEPENDENT

## 2024-10-10 ASSESSMENT — ENCOUNTER SYMPTOMS
MUSCLE WEAKNESS: 1
MUSCLE WEAKNESS: 1
APPETITE LEVEL: FAIR
DENIES PAIN: 1
LAST BOWEL MOVEMENT: 67122
PERSON REPORTING PAIN: PATIENT
DENIES PAIN: 1
DRY SKIN: 1
PERSON REPORTING PAIN: PATIENT
CHANGE IN APPETITE: UNCHANGED
DESCRIPTION OF MEMORY LOSS: SHORT TERM

## 2024-10-12 ASSESSMENT — ACTIVITIES OF DAILY LIVING (ADL)
BATHING_CURRENT_FUNCTION: MODERATE ASSIST
TOILETING: MAXIMUM ASSIST
TOILETING: ONE PERSON
BATHING ASSESSED: 1
TOILETING: 1
BATHING_CURRENT_FUNCTION: MAXIMUM ASSIST

## 2024-10-12 ASSESSMENT — PAIN SCALES - PAIN ASSESSMENT IN ADVANCED DEMENTIA (PAINAD)
BODYLANGUAGE: 1
CONSOLABILITY: 1 - DISTRACTED OR REASSURED BY VOICE OR TOUCH.
FACIALEXPRESSION: 2
NEGVOCALIZATION: 1
BODYLANGUAGE: 1 - TENSE. DISTRESSED PACING. FIDGETING.
TOTALSCORE: 5
BREATHING: 0
NEGVOCALIZATION: 1 - OCCASIONAL MOAN OR GROAN. LOW-LEVEL SPEECH WITH A NEGATIVE OR DISAPPROVING QUALITY.
CONSOLABILITY: 1
FACIALEXPRESSION: 2 - FACIAL GRIMACING.

## 2024-10-12 ASSESSMENT — ENCOUNTER SYMPTOMS: PAIN: 1

## 2024-10-14 ENCOUNTER — HOME CARE VISIT (OUTPATIENT)
Dept: HOME HEALTH SERVICES | Facility: HOME HEALTH | Age: 89
End: 2024-10-14
Payer: MEDICARE

## 2024-10-14 PROCEDURE — G0153 HHCP-SVS OF S/L PATH,EA 15MN: HCPCS | Mod: HHH

## 2024-10-14 ASSESSMENT — ENCOUNTER SYMPTOMS
AGGRESSION WITHIN DEFINED LIMITS: 1
PERSON REPORTING PAIN: PATIENT
DENIES PAIN: 1

## 2024-10-14 ASSESSMENT — PAIN SCALES - PAIN ASSESSMENT IN ADVANCED DEMENTIA (PAINAD)
FACIALEXPRESSION: 0 - SMILING OR INEXPRESSIVE.
CONSOLABILITY: 0
FACIALEXPRESSION: 0
NEGVOCALIZATION: 0
NEGVOCALIZATION: 0 - NONE.
BODYLANGUAGE: 0
TOTALSCORE: 0
CONSOLABILITY: 0 - NO NEED TO CONSOLE.
BODYLANGUAGE: 0 - RELAXED.
BREATHING: 0

## 2024-10-16 ENCOUNTER — HOME CARE VISIT (OUTPATIENT)
Dept: HOME HEALTH SERVICES | Facility: HOME HEALTH | Age: 89
End: 2024-10-16
Payer: MEDICARE

## 2024-10-16 PROCEDURE — G0151 HHCP-SERV OF PT,EA 15 MIN: HCPCS | Mod: HHH

## 2024-10-17 ENCOUNTER — HOME CARE VISIT (OUTPATIENT)
Dept: HOME HEALTH SERVICES | Facility: HOME HEALTH | Age: 89
End: 2024-10-17
Payer: MEDICARE

## 2024-10-17 VITALS — SYSTOLIC BLOOD PRESSURE: 120 MMHG | RESPIRATION RATE: 18 BRPM | DIASTOLIC BLOOD PRESSURE: 52 MMHG

## 2024-10-17 PROCEDURE — G0152 HHCP-SERV OF OT,EA 15 MIN: HCPCS | Mod: HHH

## 2024-10-17 ASSESSMENT — PAIN SCALES - PAIN ASSESSMENT IN ADVANCED DEMENTIA (PAINAD)
FACIALEXPRESSION: 2 - FACIAL GRIMACING.
NEGVOCALIZATION: 0
TOTALSCORE: 3
CONSOLABILITY: 0
BREATHING: 0
BODYLANGUAGE: 1
CONSOLABILITY: 0 - NO NEED TO CONSOLE.
FACIALEXPRESSION: 2
NEGVOCALIZATION: 0 - NONE.
BODYLANGUAGE: 1 - TENSE. DISTRESSED PACING. FIDGETING.

## 2024-10-18 ENCOUNTER — APPOINTMENT (OUTPATIENT)
Dept: HOME HEALTH SERVICES | Facility: HOME HEALTH | Age: 89
End: 2024-10-18
Payer: COMMERCIAL

## 2024-10-18 ASSESSMENT — ENCOUNTER SYMPTOMS
DENIES PAIN: 1
PERSON REPORTING PAIN: PATIENT

## 2024-10-21 ENCOUNTER — HOME CARE VISIT (OUTPATIENT)
Dept: HOME HEALTH SERVICES | Facility: HOME HEALTH | Age: 89
End: 2024-10-21
Payer: MEDICARE

## 2024-10-21 PROCEDURE — G0151 HHCP-SERV OF PT,EA 15 MIN: HCPCS | Mod: HHH

## 2024-10-24 ENCOUNTER — HOME CARE VISIT (OUTPATIENT)
Dept: HOME HEALTH SERVICES | Facility: HOME HEALTH | Age: 89
End: 2024-10-24
Payer: MEDICARE

## 2024-10-24 VITALS
TEMPERATURE: 97.5 F | DIASTOLIC BLOOD PRESSURE: 60 MMHG | OXYGEN SATURATION: 96 % | SYSTOLIC BLOOD PRESSURE: 118 MMHG | HEART RATE: 60 BPM | RESPIRATION RATE: 18 BRPM

## 2024-10-24 PROCEDURE — G0151 HHCP-SERV OF PT,EA 15 MIN: HCPCS | Mod: HHH

## 2024-10-24 PROCEDURE — G0299 HHS/HOSPICE OF RN EA 15 MIN: HCPCS | Mod: HHH

## 2024-10-24 ASSESSMENT — ACTIVITIES OF DAILY LIVING (ADL)
AMBULATION ASSISTANCE: SUPERVISION
AMBULATION ASSISTANCE: STAND BY ASSIST
AMBULATION ASSISTANCE: 1

## 2024-10-24 ASSESSMENT — ENCOUNTER SYMPTOMS
APPETITE LEVEL: FAIR
MUSCLE WEAKNESS: 1
PAIN: 1
OCCASIONAL FEELINGS OF UNSTEADINESS: 1
OCCASIONAL FEELINGS OF UNSTEADINESS: 1
DESCRIPTION OF MEMORY LOSS: SHORT TERM
DENIES PAIN: 1
FORGETFULNESS: 1
PERSON REPORTING PAIN: PATIENT
LAST BOWEL MOVEMENT: 67136
LOSS OF SENSATION IN FEET: 0
MUSCLE WEAKNESS: 1
PERSON REPORTING PAIN: PATIENT
DENIES PAIN: 1
DEPRESSION: 1

## 2024-10-25 ENCOUNTER — HOME CARE VISIT (OUTPATIENT)
Dept: HOME HEALTH SERVICES | Facility: HOME HEALTH | Age: 89
End: 2024-10-25
Payer: MEDICARE

## 2024-10-25 VITALS — HEART RATE: 58 BPM | TEMPERATURE: 98.2 F

## 2024-10-25 PROCEDURE — G0152 HHCP-SERV OF OT,EA 15 MIN: HCPCS | Mod: HHH

## 2024-10-25 ASSESSMENT — ACTIVITIES OF DAILY LIVING (ADL)
BATHING ASSESSED: 1
DRESSING_LB_CURRENT_FUNCTION: MAXIMUM ASSIST
BATHING_CURRENT_FUNCTION: MAXIMUM ASSIST
DRESSING_UB_CURRENT_FUNCTION: STAND BY ASSIST
BATHING_CURRENT_FUNCTION: ONE PERSON

## 2024-10-25 NOTE — CASE COMMUNICATION
DISCHARGE SUMMARY:    DISCIPLINE: Occupational Therapy  DATE OF DISCIPLINE DISCHARGE: 10/25/24  REASON FOR DISCHARGE: CLIENT NO LONGER REQUIRES SKILLED CARE  COORDINATION NOTE: Patient has adequate support in the home from [facility] 5 days a week from 12 to 5. She demonstrates competency with care needs and patiently encourages maximum participation with self care and observes all safety recommendations.Demonstrates understa nding of fracture precautions .  EVALUATION OF GOALS: max assist for ADL's  escepts self feeding supervision.Max potential due to patient dementia and poor activity tolerance. Pain report 0/10 this date compared to pain limiting all activity last week. Not progressing. supports in place.All caregiver education completed. Caregiver continues to decline removal of glass shower doors and placement of transfer bench. Caregiver verbalized on  3 visits that he will follow up with  for a walk in shower home modification.   SUMMARY OF CARE PROVIDED: Assessment of home safety and caregiver skills to maximize independencce and safety with dementia ADL care in the home.  DISCHARGE INSTRUCTIONS GIVEN: Continue encouraging participation , ambulation, self feeding with supervision. Continue basin bathing at edge of bed for maxiumum safety and fall risk reduction.  SERVIC ES REMAINING: skilled nursing  NOMNC OBTAINED: no

## 2024-10-29 ENCOUNTER — TELEPHONE (OUTPATIENT)
Dept: GERIATRIC MEDICINE | Facility: CLINIC | Age: 89
End: 2024-10-29
Payer: COMMERCIAL

## 2024-10-29 DIAGNOSIS — S05.32XS RUPTURE OF GLOBE OF EYE FOLLOWING BLUNT TRAUMA, LEFT, SEQUELA: ICD-10-CM

## 2024-10-29 DIAGNOSIS — S05.32XA RUPTURED GLOBE, LEFT EYE, INITIAL ENCOUNTER: Primary | ICD-10-CM

## 2024-10-31 ENCOUNTER — HOME CARE VISIT (OUTPATIENT)
Dept: HOME HEALTH SERVICES | Facility: HOME HEALTH | Age: 89
End: 2024-10-31
Payer: MEDICARE

## 2024-11-08 ENCOUNTER — HOME CARE VISIT (OUTPATIENT)
Dept: HOME HEALTH SERVICES | Facility: HOME HEALTH | Age: 89
End: 2024-11-08
Payer: MEDICARE

## 2024-11-12 ENCOUNTER — OFFICE VISIT (OUTPATIENT)
Dept: GERIATRIC MEDICINE | Facility: CLINIC | Age: 89
End: 2024-11-12
Payer: COMMERCIAL

## 2024-11-12 DIAGNOSIS — S05.32XS RUPTURE OF GLOBE OF EYE FOLLOWING BLUNT TRAUMA, LEFT, SEQUELA: Primary | ICD-10-CM

## 2024-11-12 DIAGNOSIS — I70.0 AORTIC ATHEROSCLEROSIS (CMS-HCC): ICD-10-CM

## 2024-11-12 DIAGNOSIS — H54.10 BLINDNESS AND LOW VISION: ICD-10-CM

## 2024-11-12 DIAGNOSIS — E53.1 VITAMIN B6 DEFICIENCY: ICD-10-CM

## 2024-11-12 DIAGNOSIS — G31.84 MILD COGNITIVE IMPAIRMENT: ICD-10-CM

## 2024-11-12 DIAGNOSIS — R41.0 ACUTE CONFUSION: ICD-10-CM

## 2024-11-12 DIAGNOSIS — F41.1 GAD (GENERALIZED ANXIETY DISORDER): ICD-10-CM

## 2024-11-12 DIAGNOSIS — E55.9 VITAMIN D DEFICIENCY: ICD-10-CM

## 2024-11-12 PROCEDURE — 1125F AMNT PAIN NOTED PAIN PRSNT: CPT | Performed by: NURSE PRACTITIONER

## 2024-11-12 PROCEDURE — 3078F DIAST BP <80 MM HG: CPT | Performed by: NURSE PRACTITIONER

## 2024-11-12 PROCEDURE — 99349 HOME/RES VST EST MOD MDM 40: CPT | Performed by: NURSE PRACTITIONER

## 2024-11-12 PROCEDURE — 3074F SYST BP LT 130 MM HG: CPT | Performed by: NURSE PRACTITIONER

## 2024-11-12 PROCEDURE — 1160F RVW MEDS BY RX/DR IN RCRD: CPT | Performed by: NURSE PRACTITIONER

## 2024-11-12 PROCEDURE — 1159F MED LIST DOCD IN RCRD: CPT | Performed by: NURSE PRACTITIONER

## 2024-11-12 ASSESSMENT — PAIN SCALES - GENERAL: PAINLEVEL_OUTOF10: 2

## 2024-11-13 VITALS
RESPIRATION RATE: 18 BRPM | DIASTOLIC BLOOD PRESSURE: 52 MMHG | HEART RATE: 74 BPM | SYSTOLIC BLOOD PRESSURE: 102 MMHG | TEMPERATURE: 98.4 F

## 2024-11-13 PROBLEM — H54.10 BLINDNESS AND LOW VISION: Status: ACTIVE | Noted: 2024-11-13

## 2024-11-13 PROBLEM — G31.84 MILD COGNITIVE IMPAIRMENT: Status: ACTIVE | Noted: 2024-02-18

## 2024-11-13 PROBLEM — E55.9 VITAMIN D DEFICIENCY: Status: ACTIVE | Noted: 2024-11-13

## 2024-11-13 PROBLEM — R41.0 ACUTE CONFUSION: Status: ACTIVE | Noted: 2024-11-13

## 2024-11-13 PROBLEM — I70.0 AORTIC ATHEROSCLEROSIS (CMS-HCC): Status: ACTIVE | Noted: 2024-11-13

## 2024-11-13 NOTE — PROGRESS NOTES
"      Some elements may have been copied from prior note(s). The elements have been updated and reflect current evaluation, examination and decision making from today.             Reason for visit:  Follow up s/p eye surgery,and management of chronic active illnesses.         Summary Statement: Mrs. Mancera is a 89 yo elderly woman with a PMH significant for rupture of globe of left eye following trauma s/p surgical repair (fall from bed)- 9/2024,   COPD/asthma, HFpEF (60%), HTN, Chronic compression fracture T6, polycythemia vera,  subdural hematoma (2019),  TIA, Dementia, hallucinations,  COVID 19 virus  with acute hypoxic respiratory  failure, (11/2022)  chronic leukocytosis, anemia, Lymphadenopathy concerning for lymphoma , Splenomegaly, PCV JAK2 V617F+,  UTI, weight loss,  polycythemia vera, and impaired gait     PSH:  hysterectomy ('60's) and cataract surgery  9/2024-rupture of globe of left eye following trauma s/p surgical repair (fall from bed)-     Reason for homebound status: leaving the home requires considerable taxing effort due impaired gait and the assistance of another person due to cognitive impairment            HPI: Mrs. Mancera is being seen today at home in the presence of her son, Reynold who is her primary caregiver  Reynold states things had just started to get better in terms of patient sleeping better/hallucinations  And her HHA.    Unfortunately, patient fell out of bed ans sustained a ruptured of globe of left eye requiring surgery in September.      She never had follow up.  Reynold states she was not given an appointment.  Patient appears now to be blind in her left eye. She has noticeable ptosis of the left upper eyelid.  She states that distal orbital area it  to touch and noticed to be slightly swollen.   Reynold states she has \"a little bit of one eye gtt\" that he has continued to use and then she will have completed all eye drops.     Patient is also confused today.  " "Reynold and her HHA states lately she has been.  She  is answering questions inappropriately and referring to family members who are no longer alive (no hallucinations)  She denies pain .     Reynold states she will not take the Aricept in the evening \"What's that pill? You all are trying to kill me\"   But , she will take the Escitalopram and the Vitamin B6      MEDS IN THE HOME VERIFIED TODAY      escitalopram 10 mg tablet; Commonly known as: Lexapro; Take 1 tablet (10   mg) by mouth once daily. (STOP taking the 5 mg tablet daily)   pyridoxine 100 mg tablet; Commonly known as: Vitamin B-6     ROS:  Appetite is good  Awakens in the middle of the night, but going to bed early   No diarrhea or constipation  No falls  No dizziness  No fever or chills  Denies chest pain or sob   Hallucinations at night (sundowning)    Denies dizziness or headaches  No cough, cold or flu-like symptoms     Physical Exam  /52 (BP Location: Right arm, Patient Position: Sitting, BP Cuff Size: Small adult)   Pulse 74   Temp 36.9 °C (98.4 °F) (Temporal)   Resp 18   POX=96% RA    General appearance: Alert, cooperative . Sitting upright on side of bed.  NAD.   Head and Face   Head and face: Normal.    Eyes   Inspection of eyes: Abnormal: Sclera and conjunctiva were normal to right eye.  Pupil exam: PERRLA. Extraocular movements were intact to right eye.   +ptosis of the left upper eyelid.   distal orbital area it still +  tender to touch  slightly swollen.   Unable to identify the correct number of fingers with the left eye.  Difficulty with tracking in left eye.   Ears, Nose, Mouth, and Throat   Ears: External: Normal  Oropharynx: Normal with moist mucus membranes, tongue midline, no PND, no erythema or enlargement of tonsils.  Hearing: Normal  Nasal mucosa, septum, and turbinates: Normal   Normal without edema or erythema.   Lips, teeth, and gums: abnormal. Poor dentition   Neck   Neck Exam: Appearance of the neck was normal. No neck " masses observed. No jugular vein distension.   Pulmonary   Respiratory assessment: No respiratory distress, normal respiratory rhythm and effort.  Palpation of Chest: Normal.    Cardiovascular   Auscultation of heart: Apical pulse normal, heart rate and rhythm normal, normal S1 and S2, no murmurs, no gallops and no pericardial rub.  Examination of extremities for edema and/or varicosities:     Chest   Chest: Symmetrical and normal appearance.   Abdomen   Abdominal Exam: No bruits normal bowel sounds, soft, non-tender, no abdominal masses palpated.   Bowel sounds hypoactive x 4.   Genitourinary   Bladder: deferred   Lymphatic   Palpation of lymph nodes in axillae: Normal.    No cervical lymphadenopathy  No supraclavicular LAD  Musculoskeletal   Inspection/palpation of joints, bones, and muscles:   Range of motion: Abnormal.-slight decreased to left shoulder  Muscle strength/tone: Normal. RUE/LUE 5/5 RLE/LLE 5/5   Gait: steady   Skin   Skin inspection: Skin dry, warm and intact.   Trophic changes to BLE   Neurologic   Cranial nerves: CNII-XII intact  except vision and some cognitive impairment   No tremors   Follows commands appropriately  CHRISTENSEN x 4    Psychiatric   Judgment and insight: Intact and appropriate   Orientation: Oriented to person and place.   Mood and affect: Normal  Recent and remote memory: Poor LT memory       ASSESSMENT AND PLAN  1. Rupture of globe of eye following blunt trauma, left, sequela (Primary)  -no follow up after surgery  -at the time of visit, NP called scheduling in Opthalmology - [facility]  -Could not get an appointment until March.  -NP asked if Dr. Gale's office could call back  -At the time this note was entered, office called and stated they definitely could get her in sooner  -Monday , November 25 th at 10:45 a.m. with Dr. Yepez, [address]  -NP will have our office notify son  -At the time of visit, A states she will accompany son as he states it can be difficult  getting her to outpatient appointments  -Appears to now be blind in left eye.  -Plan of care is ongoing     2. Blindness and low vision  -see Problem # 1    3. Acute confusion  -will order CBD, CMP, UA and C+S     4. Mild cognitive impairment  -refuses to take Aricept  -Advised to stop as prn use is of no benefit     5. ELISHA (generalized anxiety disorder)  -continue Escitalopram     6. Aortic atherosclerosis (CMS-HCC)  -mild  -check lipid profile    7. Vitamin B6 deficiency  -on supplementation  -check level     8. Vitamin D deficiency  -check level    +++Miscellaneous-at the time of visit NP called [facility].  States she sees the order for hospital bed, but does not see CMN. NP did not have in mailbox.  Bertin send script again.       Stable  Routine follow up in one month

## 2024-11-13 NOTE — PATIENT INSTRUCTIONS
Dear Mrs. Mancera,  It was a pleasure seeing you today.     An appointment has been made for you on Monday , November 25 th at 10:45 a.m. with [facility] [address]    I have ordered labs .  They will contact you to come out.     I will follow up with you in one month.    Sincerely,    Mary Jo Bartlett, MSN, APRN-BC

## 2024-11-19 ENCOUNTER — LAB (OUTPATIENT)
Dept: LAB | Facility: LAB | Age: 89
End: 2024-11-19
Payer: COMMERCIAL

## 2024-11-19 ENCOUNTER — HOME CARE VISIT (OUTPATIENT)
Dept: HOME HEALTH SERVICES | Facility: HOME HEALTH | Age: 89
End: 2024-11-19
Payer: MEDICARE

## 2024-11-19 DIAGNOSIS — E53.1 VITAMIN B6 DEFICIENCY: ICD-10-CM

## 2024-11-19 DIAGNOSIS — I70.0 AORTIC ATHEROSCLEROSIS (CMS-HCC): ICD-10-CM

## 2024-11-19 DIAGNOSIS — E55.9 VITAMIN D DEFICIENCY: ICD-10-CM

## 2024-11-19 DIAGNOSIS — R41.0 ACUTE CONFUSION: ICD-10-CM

## 2024-11-19 DIAGNOSIS — G31.84 MILD COGNITIVE IMPAIRMENT: Primary | ICD-10-CM

## 2024-11-19 LAB
ALBUMIN SERPL BCP-MCNC: 3.7 G/DL (ref 3.4–5)
ALP SERPL-CCNC: 79 U/L (ref 33–136)
ALT SERPL W P-5'-P-CCNC: 13 U/L (ref 7–45)
ANION GAP SERPL CALC-SCNC: 11 MMOL/L (ref 10–20)
AST SERPL W P-5'-P-CCNC: 18 U/L (ref 9–39)
BASOPHILS # BLD MANUAL: 0 X10*3/UL (ref 0–0.1)
BASOPHILS NFR BLD MANUAL: 0 %
BILIRUB SERPL-MCNC: 0.6 MG/DL (ref 0–1.2)
BUN SERPL-MCNC: 24 MG/DL (ref 6–23)
CALCIUM SERPL-MCNC: 8.3 MG/DL (ref 8.6–10.3)
CHLORIDE SERPL-SCNC: 100 MMOL/L (ref 98–107)
CHOLEST SERPL-MCNC: 94 MG/DL (ref 0–199)
CHOLESTEROL/HDL RATIO: 2.8
CO2 SERPL-SCNC: 31 MMOL/L (ref 21–32)
CREAT SERPL-MCNC: 0.56 MG/DL (ref 0.5–1.05)
DACRYOCYTES BLD QL SMEAR: ABNORMAL
EGFRCR SERPLBLD CKD-EPI 2021: 85 ML/MIN/1.73M*2
EOSINOPHIL # BLD MANUAL: 0 X10*3/UL (ref 0–0.4)
EOSINOPHIL NFR BLD MANUAL: 0 %
ERYTHROCYTE [DISTWIDTH] IN BLOOD BY AUTOMATED COUNT: 23.4 % (ref 11.5–14.5)
GLUCOSE SERPL-MCNC: 67 MG/DL (ref 74–99)
HCT VFR BLD AUTO: 35 % (ref 36–46)
HDLC SERPL-MCNC: 33.7 MG/DL
HGB BLD-MCNC: 10 G/DL (ref 12–16)
HYPOCHROMIA BLD QL SMEAR: ABNORMAL
IMM GRANULOCYTES # BLD AUTO: 1.25 X10*3/UL (ref 0–0.5)
IMM GRANULOCYTES NFR BLD AUTO: 11.1 % (ref 0–0.9)
LDLC SERPL CALC-MCNC: 35 MG/DL
LYMPHOCYTES # BLD MANUAL: 1.58 X10*3/UL (ref 0.8–3)
LYMPHOCYTES NFR BLD MANUAL: 14 %
MCH RBC QN AUTO: 24.3 PG (ref 26–34)
MCHC RBC AUTO-ENTMCNC: 28.6 G/DL (ref 32–36)
MCV RBC AUTO: 85 FL (ref 80–100)
METAMYELOCYTES # BLD MANUAL: 0.79 X10*3/UL
METAMYELOCYTES NFR BLD MANUAL: 7 %
MONOCYTES # BLD MANUAL: 0.57 X10*3/UL (ref 0.05–0.8)
MONOCYTES NFR BLD MANUAL: 5 %
NEUTROPHILS # BLD MANUAL: 8.14 X10*3/UL (ref 1.6–5.5)
NEUTS BAND # BLD MANUAL: 2.94 X10*3/UL (ref 0–0.5)
NEUTS BAND NFR BLD MANUAL: 26 %
NEUTS SEG # BLD MANUAL: 5.2 X10*3/UL (ref 1.6–5)
NEUTS SEG NFR BLD MANUAL: 46 %
NON HDL CHOLESTEROL: 60 MG/DL (ref 0–149)
NRBC BLD-RTO: 1.9 /100 WBCS (ref 0–0)
OVALOCYTES BLD QL SMEAR: ABNORMAL
PLATELET # BLD AUTO: 77 X10*3/UL (ref 150–450)
POLYCHROMASIA BLD QL SMEAR: ABNORMAL
POTASSIUM SERPL-SCNC: 4.5 MMOL/L (ref 3.5–5.3)
PROT SERPL-MCNC: 7.1 G/DL (ref 6.4–8.2)
RBC # BLD AUTO: 4.11 X10*6/UL (ref 4–5.2)
RBC MORPH BLD: ABNORMAL
SODIUM SERPL-SCNC: 137 MMOL/L (ref 136–145)
TOTAL CELLS COUNTED BLD: 100
TRIGL SERPL-MCNC: 125 MG/DL (ref 0–149)
TSH SERPL-ACNC: 2.34 MIU/L (ref 0.44–3.98)
VARIANT LYMPHS # BLD MANUAL: 0.23 X10*3/UL (ref 0–0.3)
VARIANT LYMPHS NFR BLD: 2 %
VLDL: 25 MG/DL (ref 0–40)
WBC # BLD AUTO: 11.3 X10*3/UL (ref 4.4–11.3)

## 2024-11-19 PROCEDURE — 85027 COMPLETE CBC AUTOMATED: CPT

## 2024-11-19 PROCEDURE — 82306 VITAMIN D 25 HYDROXY: CPT

## 2024-11-19 PROCEDURE — 84443 ASSAY THYROID STIM HORMONE: CPT

## 2024-11-19 PROCEDURE — 80053 COMPREHEN METABOLIC PANEL: CPT

## 2024-11-19 PROCEDURE — 85007 BL SMEAR W/DIFF WBC COUNT: CPT

## 2024-11-19 PROCEDURE — 80061 LIPID PANEL: CPT

## 2024-11-20 LAB
25(OH)D3 SERPL-MCNC: 34 NG/ML (ref 30–100)
HOLD SPECIMEN: NORMAL

## 2024-11-24 LAB — PYRIDOXAL PHOS SERPL-SCNC: 32.9 NMOL/L (ref 20–125)

## 2024-11-24 NOTE — PROGRESS NOTES
Subjective   Patient ID: Patrica Mancera is a 89 y.o. female.      HPI    89 yo here for retina check after going to ED due to a fall.  OS to be looked at.  Pt states VA is ok.  Pts family says she has been keeping OS closed a lot.  Pt states she has no real pain with the eyes.  Pt is taking pred QID.    Last edited by HUNG Yoon on 11/25/2024 11:16 AM.        Current Outpatient Medications (Ophthalmology pharm classes)   Medication Sig Dispense Refill    atropine 1 % ophthalmic solution Administer 1 drop into the left eye once daily. 5 mL 1    prednisoLONE acetate (Pred-Forte) 1 % ophthalmic suspension Administer 1 drop into the left eye 2 times a day for 14 days. 5 mL 0     Current Outpatient Medications (Other)   Medication Sig Dispense Refill    acetaminophen (Tylenol) 500 mg tablet Take 2 tablets (1,000 mg) by mouth 3 times a day as needed for mild pain (1 - 3) or moderate pain (4 - 6).      escitalopram (Lexapro) 10 mg tablet Take 1 tablet (10 mg) by mouth once daily. (STOP taking the 5 mg tablet daily) 90 tablet 3    pyridoxine (Vitamin B-6) 100 mg tablet Take 1 tablet (100 mg) by mouth once daily.         Objective   Base Eye Exam       Visual Acuity (Snellen - Linear)         Right Left    Dist sc 20/100 -2 LP              Tonometry (Tonopen, 11:25 AM)         Right Left    Pressure 15 15              Pupils         Dark    Right 5    Left 5              Visual Fields         Left Right                                Extraocular Movement         Right Left     Full Full              Neuro/Psych       Oriented x3: Yes    Mood/Affect: Normal              Dilation       Both eyes: 1% Mydriacyl & 2.5% Adelso  @ 11:25 AM                  Slit Lamp and Fundus Exam       External Exam         Right Left    External Normal Sunken globe              Slit Lamp Exam         Right Left    Lids/Lashes Normal Normal    Conjunctiva/Sclera White and quiet white    Cornea Arcus Multiple corneal sutures in place, tight  with induced corneal striae/ astigmatism    Anterior Chamber Deep and quiet formed, shallow    Iris Round and reactive dense residual hyphema at level of iris plane, some residual iris ST to inferior    Lens Phakic dense NS unable to visualize details    Anterior Vitreous Normal               Fundus Exam         Right Left    Disc Normal     C/D Ratio 0.3     Macula Normal     Vessels Normal     Periphery Normal, limited view peripherally due to lack of cooperation     No view OS                    Assessment/Plan     ED follow up seen 9/27/24     89 year old female with hx of dementia, HTN, asthma, GERD, Anemia, HFPEF, thrombocytopenia, TIA, polycythemia vera, COPD, NSVT who presented to the ED following a fall out of bed with left eye trauma. Exam is consistent with ruptured globe, left eye.     #Globe rupture, left eye  #status post (s/p) left globe exploration and repair (Venkatesh/Rhonda) on 9/26/24  - off pred 2 weeks ago ( ran out)   - off moxi 3 weeks ( ran out)   - still on atropine qday OS   -poor view today due to hyphema, no B scan available at [place] office, next visit B scan OS  -Unable to get OCT today due to patient dementia/positioning  -given history and operative findings, discussed w family the very limited visual prognosis OS   -discussed potential risks of additional surgical intervention as well as very low chance of visual improvement    -recommend close monitoring, follow up 3-4 weeks [facility] for repeat exam and B-scan  -restart pred BID OS and continue atropine qday OS  -Given Dr Ellis's recommendation, refer to Cornea as well evaluate corneal sutures OS, assess cataract OD    #(?) ho foreign body right eye 15 years ago   #cataract OD  -unclear history per family   -cataract evaluation with Cornea   -discussed w patient and family that dense cataract OD likely limiting vision and this is her good eye, however pt with history of dementia and has previously declined surgery  -defer to Cornea  surgery vs obs

## 2024-11-25 ENCOUNTER — APPOINTMENT (OUTPATIENT)
Dept: OPHTHALMOLOGY | Facility: CLINIC | Age: 89
End: 2024-11-25
Payer: MEDICARE

## 2024-11-25 DIAGNOSIS — H53.482 GENERALIZED CONTRACTION OF VISUAL FIELD, LEFT EYE: ICD-10-CM

## 2024-11-25 DIAGNOSIS — S05.22XD RUPTURED GLOBE OF LEFT EYE WITH UVEAL PROLAPSE, SUBSEQUENT ENCOUNTER: Primary | ICD-10-CM

## 2024-11-25 PROCEDURE — 99024 POSTOP FOLLOW-UP VISIT: CPT | Performed by: OPHTHALMOLOGY

## 2024-11-25 RX ORDER — PREDNISOLONE ACETATE 10 MG/ML
1 SUSPENSION/ DROPS OPHTHALMIC 2 TIMES DAILY
Qty: 5 ML | Refills: 0 | Status: SHIPPED | OUTPATIENT
Start: 2024-11-25 | End: 2024-12-09

## 2024-11-25 RX ORDER — ATROPINE SULFATE 10 MG/ML
1 SOLUTION/ DROPS OPHTHALMIC DAILY
Qty: 5 ML | Refills: 1 | Status: SHIPPED | OUTPATIENT
Start: 2024-11-25 | End: 2025-11-25

## 2024-11-25 ASSESSMENT — ENCOUNTER SYMPTOMS
GASTROINTESTINAL NEGATIVE: 0
HEMATOLOGIC/LYMPHATIC NEGATIVE: 0
EYES NEGATIVE: 1
CONSTITUTIONAL NEGATIVE: 0
ALLERGIC/IMMUNOLOGIC NEGATIVE: 0
PSYCHIATRIC NEGATIVE: 0
MUSCULOSKELETAL NEGATIVE: 0
CARDIOVASCULAR NEGATIVE: 0
NEUROLOGICAL NEGATIVE: 0
ENDOCRINE NEGATIVE: 0
RESPIRATORY NEGATIVE: 0

## 2024-11-25 ASSESSMENT — SLIT LAMP EXAM - LIDS
COMMENTS: NORMAL
COMMENTS: NORMAL

## 2024-11-25 ASSESSMENT — VISUAL ACUITY
OS_SC: LP
OD_SC+: -2
OD_SC: 20/100
METHOD: SNELLEN - LINEAR

## 2024-11-25 ASSESSMENT — CUP TO DISC RATIO: OD_RATIO: 0.3

## 2024-11-25 ASSESSMENT — TONOMETRY
OD_IOP_MMHG: 15
IOP_METHOD: TONOPEN
OS_IOP_MMHG: 15

## 2024-11-25 ASSESSMENT — CONF VISUAL FIELD
OS_SUPERIOR_NASAL_RESTRICTION: 1
OS_SUPERIOR_TEMPORAL_RESTRICTION: 1
OS_INFERIOR_NASAL_RESTRICTION: 1
OS_INFERIOR_TEMPORAL_RESTRICTION: 1

## 2024-11-25 ASSESSMENT — EXTERNAL EXAM - RIGHT EYE: OD_EXAM: NORMAL

## 2024-11-26 ENCOUNTER — HOME CARE VISIT (OUTPATIENT)
Dept: HOME HEALTH SERVICES | Facility: HOME HEALTH | Age: 89
End: 2024-11-26
Payer: MEDICARE

## 2024-11-26 ASSESSMENT — ENCOUNTER SYMPTOMS
DENIES PAIN: 1
PERSON REPORTING PAIN: PATIENT

## 2024-11-26 ASSESSMENT — ACTIVITIES OF DAILY LIVING (ADL)
OASIS_M1830: 02
HOME_HEALTH_OASIS: 01

## 2024-12-09 ENCOUNTER — APPOINTMENT (OUTPATIENT)
Dept: OPHTHALMOLOGY | Facility: CLINIC | Age: 89
End: 2024-12-09
Payer: COMMERCIAL

## 2025-01-15 ENCOUNTER — APPOINTMENT (OUTPATIENT)
Dept: OPHTHALMOLOGY | Facility: CLINIC | Age: OVER 89
End: 2025-01-15
Payer: COMMERCIAL

## 2025-01-28 ENCOUNTER — OFFICE VISIT (OUTPATIENT)
Dept: GERIATRIC MEDICINE | Facility: CLINIC | Age: OVER 89
End: 2025-01-28
Payer: COMMERCIAL

## 2025-01-28 DIAGNOSIS — I95.9 HYPOTENSION, UNSPECIFIED HYPOTENSION TYPE: ICD-10-CM

## 2025-01-28 DIAGNOSIS — F03.90 MAJOR NEUROCOGNITIVE DISORDER (MULTI): ICD-10-CM

## 2025-01-28 DIAGNOSIS — F41.1 GAD (GENERALIZED ANXIETY DISORDER): ICD-10-CM

## 2025-01-28 DIAGNOSIS — S05.22XS: Primary | ICD-10-CM

## 2025-01-28 PROCEDURE — 3078F DIAST BP <80 MM HG: CPT | Performed by: NURSE PRACTITIONER

## 2025-01-28 PROCEDURE — 1126F AMNT PAIN NOTED NONE PRSNT: CPT | Performed by: NURSE PRACTITIONER

## 2025-01-28 PROCEDURE — 1159F MED LIST DOCD IN RCRD: CPT | Performed by: NURSE PRACTITIONER

## 2025-01-28 PROCEDURE — 99349 HOME/RES VST EST MOD MDM 40: CPT | Performed by: NURSE PRACTITIONER

## 2025-01-28 PROCEDURE — 3074F SYST BP LT 130 MM HG: CPT | Performed by: NURSE PRACTITIONER

## 2025-01-28 PROCEDURE — 1036F TOBACCO NON-USER: CPT | Performed by: NURSE PRACTITIONER

## 2025-01-28 PROCEDURE — 1160F RVW MEDS BY RX/DR IN RCRD: CPT | Performed by: NURSE PRACTITIONER

## 2025-01-28 ASSESSMENT — PAIN SCALES - GENERAL: PAINLEVEL_OUTOF10: 0-NO PAIN

## 2025-01-30 VITALS
HEART RATE: 70 BPM | DIASTOLIC BLOOD PRESSURE: 58 MMHG | TEMPERATURE: 98.4 F | RESPIRATION RATE: 18 BRPM | SYSTOLIC BLOOD PRESSURE: 102 MMHG

## 2025-01-30 PROBLEM — I95.9 HYPOTENSION: Status: ACTIVE | Noted: 2025-01-30

## 2025-01-30 PROBLEM — S05.22XA: Status: ACTIVE | Noted: 2024-09-26

## 2025-01-30 NOTE — PATIENT INSTRUCTIONS
Dear Mrs. Mancera,    It was a pleasure seeing you today.      Overall , you are doing well.     Continue the current treatment plan .    I will follow up with you in 2-3 months .    Sincerely,    Mary Jo Bartlett MSN, APRN-BC

## 2025-01-30 NOTE — PROGRESS NOTES
"Some elements may have been copied from prior note(s). The elements have been updated and reflect current   evaluation, examination and decision making from today.         Reason for visit:  Follow up s/p eye injury and management of chronic active illnesses.     Summary Statement: Mrs. Mancera is a 89 yo elderly woman with a PMH significant for rupture of globe of left eye following trauma s/p surgical repair (fall from bed)- 9/2024,   COPD/asthma, HFpEF (60%), HTN, Chronic compression fracture T6, polycythemia vera,  subdural hematoma (2019),  TIA, Dementia, hallucinations,  COVID 19 virus  with acute hypoxic respiratory  failure, (11/2022)  chronic leukocytosis, anemia, Lymphadenopathy concerning for lymphoma , Splenomegaly, PCV JAK2 V617F+,  UTI, weight loss,  polycythemia vera, and impaired gait     PSH:  hysterectomy ('60's) and cataract surgery  9/2024-rupture of globe of left eye following trauma s/p surgical repair (fall from bed)-     Reason for homebound status: leaving the home requires considerable taxing effort due impaired gait and the assistance of another person due to cognitive impairment            HPI: Mrs. Mancera is being seen today at home in the presence of her son, Reynold who is her primary caregiver  Patient is a poor historian due to cognitive impairment    In the interim she did follow up with Opthalmology.  Reynold states was told nothing could be done to restore vision to left eye   S/p injury , fall from bed and hit eye on the edge of draw.     Reynold's sister-in-law accompanied them to the visit  He states patient did well until the end  He went to the restroom while sister in law stayed with patient  He returned and \"Are you ready to go?\"  States patient became confused \"Who are you.  Why are you calling me Momma?  You are not my son! Help!! This man is trying to take me\".    Eventually, he was able to get her in the car.  Today she looks well.  Talking excessively .  Reynold states " this is all the time, but states he has really   Noticed a difference since increasing the Escitalopram to 10 mg daily.        MEDS IN THE HOME VERIFIED TODAY      escitalopram 10 mg tablet; Commonly known as: Lexapro; Take 1 tablet (10   mg) by mouth once daily.    pyridoxine 100 mg tablet; Commonly known as: Vitamin B-6     ROS:  -Unable to obtain due to cognitive impairment   -Appetite remains very good  -Drinks fluids  -Sleeping better  -No falls/ED visits or hospitalizations   -Occasional incontinence in bed  -States [facility] did call about the hospital bed, but he missed the call  -No diarrhea or constipation   -No cough, cold or flu-like symptoms  Patient denies dizziness or headache at the time of visit      Physical Exam  /58 (BP Location: Right arm, Patient Position: Sitting, BP Cuff Size: Small adult)   Pulse 70   Temp 36.9 °C (98.4 °F) (Temporal)   Resp 18 /POX=95% RA      General appearance: Alert, cooperative . Sitting upright on side of bed.  NAD.   Head and Face   Head and face: Normal.    Eyes   Inspection of eyes: Abnormal: Sclera and conjunctiva were normal to right eye.  Pupil exam: PERRLA. Extraocular movements were intact to right eye.   +ptosis of the left upper eyelid.    Difficulty with tracking in left eye.   Ears, Nose, Mouth, and Throat   Ears: External: Normal  Oropharynx: Normal with moist mucus membranes, tongue midline  , no PND, no erythema or enlargement of tonsils.  Hearing: Normal  Nasal mucosa, septum, and turbinates: Normal   Normal without edema or erythema.   Lips, teeth, and gums: abnormal. Poor dentition   Neck   Neck Exam: Appearance of the neck was normal. No neck masses observed.   No jugular vein distension.   Pulmonary   Respiratory assessment: No respiratory distress, normal respiratory rhythm and effort.  Palpation of Chest: Normal.    Cardiovascular   Auscultation of heart: Apical pulse normal, heart rate and rhythm normal,   normal S1 and S2, no  murmurs, no gallops and no pericardial rub.  Examination of extremities for edema and/or varicosities:     Chest   Chest: deferred  Abdomen   Abdominal Exam: No bruits normal bowel sounds, soft, non-tender,   no abdominal masses palpated.   Bowel sounds hypoactive x 4.   Genitourinary   Bladder: deferred   Lymphatic   Palpation of lymph nodes in axillae: Normal.    No cervical lymphadenopathy  No supraclavicular LAD  Musculoskeletal   Inspection/palpation of joints, bones, and muscles:   Range of motion: Abnormal.-slight decreased to left shoulder  Muscle strength/tone: Normal. RUE/LUE 5/5 RLE/LLE 5/5   Gait: ambulated with assistance of 1 person to the bedside commode  No difficulty going from a sitting to a standing position  Skin   Skin inspection: Skin dry, warm and intact.   Trophic changes to BLE   -No dry or flaking skin   Neurologic   Cranial nerves: CNII-XII intact  except vision and cognitive impairment   No tremors   Follows commands appropriately  CHRISTENSEN x 4    Psychiatric   Judgment and insight: Intact and appropriate   Orientation: Oriented to person and place.   Memory: Poor.  Calls son the name of her other son   Mood and affect: Normal. Talking excessively-randomly   Recent and remote memory: Poor LT memory      LABS    Chemistry    Lab Results   Component Value Date/Time     11/19/2024 1131    K 4.5 11/19/2024 1131     11/19/2024 1131    CO2 31 11/19/2024 1131    BUN 24 (H) 11/19/2024 1131    CREATININE 0.56 11/19/2024 1131    Lab Results   Component Value Date/Time    CALCIUM 8.3 (L) 11/19/2024 1131    ALKPHOS 79 11/19/2024 1131    AST 18 11/19/2024 1131    ALT 13 11/19/2024 1131    BILITOT 0.6 11/19/2024 1131        Lab Results   Component Value Date    WBC 11.3 11/19/2024    HGB 10.0 (L) 11/19/2024    HCT 35.0 (L) 11/19/2024    MCV 85 11/19/2024    PLT 77 (L) 11/19/2024          ASSESSMENT AND PLAN      1. 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 Major neurocognitive disorder (Multi)  -with behavior disturbances  -visual and auditory hallucinations  -Donepezil was discontinued due to patient would not take it  -Total care  -Well cared for in the home   -No documentation of prolonged Qtc     3. Hypotension, unspecified hypotension type  -one time today  -asymptomatic  -no s/s of dehydration     4. ELISHA (generalized anxiety disorder)  -significant improvement on the higher dosage or Escitalopram  -No s/s of adverse effects       Stable  Routine follow up in 2-3 months

## 2025-04-28 ENCOUNTER — OFFICE VISIT (OUTPATIENT)
Dept: GERIATRIC MEDICINE | Facility: CLINIC | Age: OVER 89
End: 2025-04-28
Payer: COMMERCIAL

## 2025-04-28 VITALS — DIASTOLIC BLOOD PRESSURE: 50 MMHG | SYSTOLIC BLOOD PRESSURE: 118 MMHG | HEART RATE: 73 BPM | RESPIRATION RATE: 18 BRPM

## 2025-04-28 DIAGNOSIS — F03.90 MAJOR NEUROCOGNITIVE DISORDER (MULTI): Primary | ICD-10-CM

## 2025-04-28 DIAGNOSIS — I95.9 HYPOTENSION, UNSPECIFIED HYPOTENSION TYPE: ICD-10-CM

## 2025-04-28 DIAGNOSIS — F41.1 GAD (GENERALIZED ANXIETY DISORDER): ICD-10-CM

## 2025-04-28 DIAGNOSIS — E53.1 VITAMIN B6 DEFICIENCY: ICD-10-CM

## 2025-04-28 PROCEDURE — 3074F SYST BP LT 130 MM HG: CPT | Performed by: NURSE PRACTITIONER

## 2025-04-28 PROCEDURE — 1126F AMNT PAIN NOTED NONE PRSNT: CPT | Performed by: NURSE PRACTITIONER

## 2025-04-28 PROCEDURE — 1036F TOBACCO NON-USER: CPT | Performed by: NURSE PRACTITIONER

## 2025-04-28 PROCEDURE — 3078F DIAST BP <80 MM HG: CPT | Performed by: NURSE PRACTITIONER

## 2025-04-28 PROCEDURE — 1160F RVW MEDS BY RX/DR IN RCRD: CPT | Performed by: NURSE PRACTITIONER

## 2025-04-28 PROCEDURE — 99349 HOME/RES VST EST MOD MDM 40: CPT | Performed by: NURSE PRACTITIONER

## 2025-04-28 PROCEDURE — 1159F MED LIST DOCD IN RCRD: CPT | Performed by: NURSE PRACTITIONER

## 2025-04-28 ASSESSMENT — PAIN SCALES - GENERAL: PAINLEVEL_OUTOF10: 0-NO PAIN

## 2025-04-28 NOTE — PATIENT INSTRUCTIONS
Dear Mrs. Mancera,    It is always a pleasure to see you.    Overall, you are doing well.    We will continue the current medications as prescribed.    We will defer blood work until a later time.     I will follow up with you in 2 months or sooner if needed.    Sincerely,    Mary Jo Bartlett, MSN, APRN-BC

## 2025-04-28 NOTE — PROGRESS NOTES
"Some elements may have been copied from prior note(s). The elements have been updated and reflect current evaluation, examination and decision making from today.           Reason for visit:  Dementia with behavioral disturbance and management of chronic active illnesses.       Summary Statement: Mrs. Mancera is a 89 yo elderly woman with a PMH significant for rupture of globe of left eye following trauma s/p surgical repair (fall from bed)- 2024,   COPD/asthma, HFpEF (60%), HTN, Chronic compression fracture T6, polycythemia vera,  subdural hematoma (),  TIA, Dementia, hallucinations,  COVID 19 virus  with acute hypoxic respiratory  failure, (2022)  chronic leukocytosis, anemia, Lymphadenopathy concerning for lymphoma , Splenomegaly, PCV JAK2 V617F+,  UTI, weight loss,  polycythemia vera, and impaired gait     PSH:  hysterectomy () and cataract surgery  2024-rupture of globe of left eye following trauma s/p surgical repair (fall from bed)-     Reason for homebound status: leaving the home requires considerable taxing effort due impaired gait and the assistance of another person due to cognitive impairment            HPI: Mrs. Mancera is being seen today at home in the presence of her son, Reynold who is her primary caregiver  Patient is a poor historian due to cognitive impairment   At the time of visit she is eating lunch. No dysphagia, coughing or choking  Reynold states her appetite remains very good  He had to decrease the number of Boost supplements due to was causing diarrhea.    He states she has been doing better with her mood.  \"Not as agitated. Not calling out as much . Sometimes asks for  relatives\"  No falls /ED visits or hospitalizations  He has decided not to get the hospital bed but to purchase bed rails.  He feels that patient's current bed will be more comfortable.   States she does not drink water well.   and  16 ounce bottles/day  Urine is clear yellow and " "non-foul-smelling .   Moving bowels well  No urinary frequency  Sleeping better    Patient denies any pain or discomfort at the time of visit, \"everything is fine\".    She receives HHA services (HHA is here today)           ROS:  -Unable to obtain due to cognitive impairment   -Appetite remains very good  -Drinks fluids  -Sleeping better  -No falls/ED visits or hospitalizations   -Occasional incontinence in bed  -States [facility] did call about the hospital bed, but he missed the call  -No diarrhea or constipation   -No cough, cold or flu-like symptoms  Patient denies dizziness or headache at the time of visit      Physical Exam   /50 (BP Location: Right arm, Patient Position: Sitting, BP Cuff Size: Small adult)   Pulse 73   Resp 18   /POX= 97% RA    General appearance: Alert, cooperative . Sitting upright on side of bed eating lunch.  NAD.   Head and Face   Head and face: Normal.    Eyes   Inspection of eyes: Abnormal: Sclera and conjunctiva were normal to right eye.  Pupil exam: PERRLA. Extraocular movements were intact to right eye.   +ptosis of the left upper eyelid.    Difficulty with tracking in left eye.   Ears, Nose, Mouth, and Throat   Ears: External: Normal  Oropharynx: Normal with moist mucus membranes, tongue midline  , no PND, no erythema or enlargement of tonsils.  Hearing: Normal  Nasal mucosa, septum, and turbinates: Normal   Normal without edema or erythema.   Lips, teeth, and gums: abnormal. Poor dentition   Neck   Neck Exam: Appearance of the neck was normal. No neck masses observed.   No jugular vein distension.   Pulmonary   Respiratory assessment: No respiratory distress, normal respiratory rhythm and effort.  Palpation of Chest: Normal.    Cardiovascular   Auscultation of heart: Apical pulse normal, heart rate and rhythm normal,   normal S1 and S2, no murmurs, no gallops and no pericardial rub.  Examination of extremities for edema and/or varicosities:   Chest   Chest: " deferred  Abdomen   Abdominal Exam: No bruits normal bowel sounds, soft, non-tender,   no abdominal masses palpated.   Bowel sounds hypoactive x 4.   Genitourinary   Bladder: deferred   Lymphatic   Palpation of lymph nodes in axillae: Normal.    No cervical lymphadenopathy  No supraclavicular LAD  Musculoskeletal   Inspection/palpation of joints, bones, and muscles:   Range of motion: Abnormal.-slight decreased to left shoulder  Muscle strength/tone: Normal. RUE/LUE 5/5 RLE/LLE 5/5   Gait: ambulated with assistance of 1 person to the bedside commode  No difficulty going from a sitting to a standing position  Skin   Skin inspection: Skin dry, warm and intact.   Trophic changes to BLE   -No dry or flaking skin   Neurologic   Cranial nerves: CNII-XII intact  except vision and cognitive impairment   No tremors   Follows commands appropriately  CHRISTENSEN x 4    Psychiatric   Judgment and insight: Intact and appropriate   Orientation: Oriented to person and place.   Memory: Poor.  Calls son the name of her other son   Mood and affect: Normal. Talking excessively-randomly   Recent and remote memory: Poor LT memory      LABS    Chemistry    Lab Results   Component Value Date/Time     11/19/2024 1131    K 4.5 11/19/2024 1131     11/19/2024 1131    CO2 31 11/19/2024 1131    BUN 24 (H) 11/19/2024 1131    CREATININE 0.56 11/19/2024 1131    Lab Results   Component Value Date/Time    CALCIUM 8.3 (L) 11/19/2024 1131    ALKPHOS 79 11/19/2024 1131    AST 18 11/19/2024 1131    ALT 13 11/19/2024 1131    BILITOT 0.6 11/19/2024 1131        ASSESSMENT/PLAN    1. Major neurocognitive disorder (Multi) (Primary)  -total care   -no signs of abuse or neglect  -well care for by son     2. ELISHA (generalized anxiety disorder)  -son reports good results on Escitalopram 10 mg daily  -continue     3. Vitamin B6 deficiency  -continue supplementation     4. Hypotension, unspecified hypotension type  -asymptomatic  -most likely due to poor fluid  intake  -continue to encourage patient as much ast possible      Stable  Routine follow up in 2 months

## 2025-05-12 ENCOUNTER — TELEPHONE (OUTPATIENT)
Dept: GERIATRIC MEDICINE | Facility: CLINIC | Age: OVER 89
End: 2025-05-12
Payer: COMMERCIAL
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
## 2025-05-12 PROBLEM — R53.1 GENERALIZED WEAKNESS: Status: ACTIVE | Noted: 2025-05-12

## 2025-05-12 NOTE — TELEPHONE ENCOUNTER
Son called and requested bed rails.  NP discussed with son at last visit that insurance DME companies do not usually cover this item.  NP showed patient reputable brands/types on [employer] and [employer].     Will submit order to a DME.  DB

## 2025-05-29 PROCEDURE — G0179 MD RECERTIFICATION HHA PT: HCPCS | Performed by: CLINICAL NURSE SPECIALIST

## 2025-06-11 PROCEDURE — G0179 MD RECERTIFICATION HHA PT: HCPCS | Performed by: NURSE PRACTITIONER

## 2025-07-15 ENCOUNTER — OFFICE VISIT (OUTPATIENT)
Dept: GERIATRIC MEDICINE | Facility: CLINIC | Age: OVER 89
End: 2025-07-15
Payer: COMMERCIAL

## 2025-07-15 DIAGNOSIS — M17.12 OSTEOARTHRITIS OF LEFT KNEE, UNSPECIFIED OSTEOARTHRITIS TYPE: Primary | ICD-10-CM

## 2025-07-15 DIAGNOSIS — F03.90 MAJOR NEUROCOGNITIVE DISORDER (MULTI): ICD-10-CM

## 2025-07-15 DIAGNOSIS — I63.89 CEREBROVASCULAR ACCIDENT (CVA) DUE TO OTHER MECHANISM: ICD-10-CM

## 2025-07-15 DIAGNOSIS — R44.3 HALLUCINATIONS: ICD-10-CM

## 2025-07-15 PROCEDURE — G2211 COMPLEX E/M VISIT ADD ON: HCPCS | Performed by: NURSE PRACTITIONER

## 2025-07-15 PROCEDURE — 99349 HOME/RES VST EST MOD MDM 40: CPT | Performed by: NURSE PRACTITIONER

## 2025-07-15 PROCEDURE — 3074F SYST BP LT 130 MM HG: CPT | Performed by: NURSE PRACTITIONER

## 2025-07-15 PROCEDURE — 1159F MED LIST DOCD IN RCRD: CPT | Performed by: NURSE PRACTITIONER

## 2025-07-15 PROCEDURE — 1126F AMNT PAIN NOTED NONE PRSNT: CPT | Performed by: NURSE PRACTITIONER

## 2025-07-15 PROCEDURE — 3078F DIAST BP <80 MM HG: CPT | Performed by: NURSE PRACTITIONER

## 2025-07-15 PROCEDURE — 1160F RVW MEDS BY RX/DR IN RCRD: CPT | Performed by: NURSE PRACTITIONER

## 2025-07-15 PROCEDURE — 1036F TOBACCO NON-USER: CPT | Performed by: NURSE PRACTITIONER

## 2025-07-15 ASSESSMENT — PAIN SCALES - GENERAL: PAINLEVEL_OUTOF10: 0-NO PAIN

## 2025-07-15 NOTE — PROGRESS NOTES
Some elements may have been copied from prior note(s). The elements have been updated and reflect current evaluation, examination and decision making from today.          Some elements may have been copied from prior note(s). The elements have been updated and reflect current evaluation, examination and decision making from today.             Reason for visit:  OAand follow up for management of chronic active illnesses.          Summary Statement: Mrs. Mancera is a 89 yo elderly woman with a PMH significant for rupture of globe of left eye following trauma s/p surgical repair (fall from bed)- 9/2024,   COPD/asthma, HFpEF (60%), HTN, Chronic compression fracture T6, polycythemia vera,  subdural hematoma (2019),  TIA, Dementia, hallucinations,  COVID 19 virus  with acute hypoxic respiratory  failure, (11/2022)  chronic leukocytosis, anemia, Lymphadenopathy concerning for lymphoma , Splenomegaly, PCV JAK2 V617F+,  UTI, weight loss,  polycythemia vera, and impaired gait     PSH:  hysterectomy ('60's) and cataract surgery  9/2024-rupture of globe of left eye following trauma s/p surgical repair (fall from bed)-     Reason for homebound status: leaving the home requires considerable taxing effort due impaired gait and the assistance of another person due to cognitive impairment            HPI: Mrs. Mancera is being seen today at home in the presence of her son, Reynold who is her primary caregiver  Patient is a poor historian due to cognitive impairment   At the time of visit patient is resting in bed.   She arouses easily  She is without any complaints  Ernie states patient would benefit from a walk in shower.  It is difficulty to get into the tub or shower due to her poor visit, and trying to lift her leg over the edge of tub.  No other issues or concerns    ROS:  -No cough, cold or flu-like symptoms   -Appetite remains very good  -Drinks fluids  -Sleeping better       Physical Exam   /66 Comment: supine, left  arm  Pulse 69   Resp 18   /POX= 94% RA    General appearance: Alert, cooperative .Lying in bed  supine-resting. NAD.   Head and Face   Head and face: Normal.    Eyes   Inspection of eyes: Abnormal: Sclera and conjunctiva were normal to right eye.  Pupil exam: PERRLA. Extraocular movements were intact to right eye.   +ptosis of the left upper eyelid.    Difficulty with tracking in left eye.   Ears, Nose, Mouth, and Throat   Ears: External: Normal  Oropharynx: Normal with moist mucus membranes, tongue midline  , no PND, no erythema or enlargement of tonsils.  Hearing: Normal  Nasal mucosa, septum, and turbinates: Normal   Normal without edema or erythema.   Lips, teeth, and gums: abnormal. Poor dentition   Neck   Neck Exam: Appearance of the neck was normal. No neck masses observed.   No jugular vein distension.   Pulmonary   Respiratory assessment: No respiratory distress, normal respiratory rhythm and effort.  Palpation of Chest: Normal.    Cardiovascular   Auscultation of heart: Apical pulse normal, heart rate and rhythm normal,   normal S1 and S2, no murmurs, no gallops and no pericardial rub.  Examination of extremities for edema and/or varicosities:   Chest   Chest: deferred  Abdomen   Abdominal Exam: No bruits normal bowel sounds, soft, non-tender,   no abdominal masses palpated.   Bowel sounds hypoactive x 4.   Genitourinary   Bladder: deferred   Lymphatic   Palpation of lymph nodes in axillae: Normal.    No cervical lymphadenopathy  No supraclavicular LAD  Musculoskeletal   Inspection/palpation of joints, bones, and muscles:   Range of motion: Abnormal.-slight decreased to left shoulder  Muscle strength/tone: Normal. RUE/LUE 5/5 RLE/LLE 5/5   Gait: ambulated with assistance of 1 person to the bedside commode  No difficulty going from a sitting to a standing position  Skin   Skin inspection: Skin dry, warm and intact.   Trophic changes to BLE   -No dry or flaking skin   Neurologic   Cranial nerves: CNII-XII  intact  except vision and cognitive impairment   No tremors   Follows commands appropriately  CHRISTENSEN x 4    Psychiatric   Judgment and insight: Intact and appropriate   Orientation: Oriented to person and place.   Memory: Poor.  Calls son the name of her other son   Mood and affect: Normal. Talking excessively-randomly   Recent and remote memory: Poor LT memory      LABS    Chemistry           Lab Results   Component Value Date/Time      11/19/2024 1131     K 4.5 11/19/2024 1131      11/19/2024 1131     CO2 31 11/19/2024 1131     BUN 24 (H) 11/19/2024 1131     CREATININE 0.56 11/19/2024 1131          Lab Results   Component Value Date/Time     CALCIUM 8.3 (L) 11/19/2024 1131     ALKPHOS 79 11/19/2024 1131     AST 18 11/19/2024 1131     ALT 13 11/19/2024 1131     BILITOT 0.6 11/19/2024 1131         ASSESSMENT/PLAN     1. Osteoarthritis of left knee, unspecified osteoarthritis type (Primary)  -difficulty getting into shower  -would benefit from a walk in shower  -Will fax script to patient's , [facility]     2. Cerebrovascular accident (CVA) due to other mechanism  -BP well controlled     3. Major neurocognitive disorder/Hallucinations  -baseline  -total care  -continue Escitalopram    Stable  Routine follow up in in 2 months

## 2025-07-22 VITALS — RESPIRATION RATE: 18 BRPM | HEART RATE: 69 BPM | DIASTOLIC BLOOD PRESSURE: 66 MMHG | SYSTOLIC BLOOD PRESSURE: 128 MMHG

## 2025-07-23 NOTE — PATIENT INSTRUCTIONS
Dear Mrs. Mancera,  It was a pleasure seeing you today.    I will fax the scrip to Jasiel       I will follow up with you in 2 months.  Sincerely,    Mary Jo Bartlett, MSN, APRN-BC

## 2025-08-30 DIAGNOSIS — F41.1 GAD (GENERALIZED ANXIETY DISORDER): ICD-10-CM

## 2025-09-03 DIAGNOSIS — F41.1 GAD (GENERALIZED ANXIETY DISORDER): ICD-10-CM

## 2025-09-03 RX ORDER — ESCITALOPRAM OXALATE 10 MG/1
TABLET ORAL
Qty: 90 TABLET | Refills: 3 | Status: SHIPPED | OUTPATIENT
Start: 2025-09-03 | End: 2025-09-03 | Stop reason: SDUPTHER

## 2025-09-04 RX ORDER — ESCITALOPRAM OXALATE 10 MG/1
10 TABLET ORAL DAILY
Qty: 90 TABLET | Refills: 3 | Status: SHIPPED | OUTPATIENT
Start: 2025-09-04

## (undated) DEVICE — GOWN, ASTOUND, L

## (undated) DEVICE — SUTURE, NYLON, 10-0, 12 IN, CU5, DA, BLACK

## (undated) DEVICE — SPEAR, EYE, SURGICAL, WECK-CELL, CELLULOSE

## (undated) DEVICE — Device

## (undated) DEVICE — DRESSING, TRANSPARENT, TEGADERM, 2-3/8 X 2-3/4 IN

## (undated) DEVICE — NEEDLE, HYDRODISSECTION 25G 11MM BEND

## (undated) DEVICE — CANNULA, 27G X 22MM, ANTERIOR, CHAMBER, RYCROFT

## (undated) DEVICE — NEEDLE, HYPODERMIC, REGULAR WALL, REGULAR BEVEL, 30 G X 0.5 IN

## (undated) DEVICE — PHACO PAK, CENTURION, 0.9 TIPLESS

## (undated) DEVICE — COVER, CART, 45 X 27 X 48 IN, CLEAR

## (undated) DEVICE — NEEDLE, FILTER 19 G X 1 IN

## (undated) DEVICE — MANIFOLD, 4 PORT NEPTUNE STANDARD

## (undated) DEVICE — DRAPE, INCISE, STERI DRAPE, 48 X 51 IN, LF, STERILE

## (undated) DEVICE — SHIELD, EYE, FOX, CONVEX, ALUMINUM, NS

## (undated) DEVICE — SYRINGE, HYPODERMIC, TB, W/O NEEDLE, 1 CC, SLIP TIP

## (undated) DEVICE — SPEAR, EYE, SURGICAL, WECK-CEL, CELLULOSE

## (undated) DEVICE — CANNULA, VISTEC, ANTERIOR CHAMBER, RYCROFT, ANGLED, 30 G X 7/8 IN